# Patient Record
Sex: MALE | Race: WHITE | NOT HISPANIC OR LATINO | Employment: FULL TIME | URBAN - NONMETROPOLITAN AREA
[De-identification: names, ages, dates, MRNs, and addresses within clinical notes are randomized per-mention and may not be internally consistent; named-entity substitution may affect disease eponyms.]

---

## 2018-02-12 ENCOUNTER — LAB (OUTPATIENT)
Dept: LAB | Facility: HOSPITAL | Age: 28
End: 2018-02-12
Attending: PEDIATRICS

## 2018-02-12 ENCOUNTER — TRANSCRIBE ORDERS (OUTPATIENT)
Dept: ADMINISTRATIVE | Facility: HOSPITAL | Age: 28
End: 2018-02-12

## 2018-02-12 DIAGNOSIS — R50.9 FEVER, UNSPECIFIED FEVER CAUSE: ICD-10-CM

## 2018-02-12 DIAGNOSIS — R50.9 FEVER, UNSPECIFIED FEVER CAUSE: Primary | ICD-10-CM

## 2018-02-12 LAB
FLUAV AG NPH QL: POSITIVE
FLUBV AG NPH QL IA: NEGATIVE

## 2018-02-12 PROCEDURE — 87804 INFLUENZA ASSAY W/OPTIC: CPT

## 2020-02-03 ENCOUNTER — OFFICE VISIT (OUTPATIENT)
Dept: RETAIL CLINIC | Facility: CLINIC | Age: 30
End: 2020-02-03

## 2020-02-03 VITALS
HEART RATE: 99 BPM | BODY MASS INDEX: 23.61 KG/M2 | WEIGHT: 150.4 LBS | SYSTOLIC BLOOD PRESSURE: 94 MMHG | TEMPERATURE: 98.7 F | DIASTOLIC BLOOD PRESSURE: 66 MMHG | RESPIRATION RATE: 20 BRPM | HEIGHT: 67 IN | OXYGEN SATURATION: 97 %

## 2020-02-03 DIAGNOSIS — J06.9 ACUTE URI: Primary | ICD-10-CM

## 2020-02-03 DIAGNOSIS — H61.23 BILATERAL IMPACTED CERUMEN: ICD-10-CM

## 2020-02-03 PROCEDURE — 99213 OFFICE O/P EST LOW 20 MIN: CPT | Performed by: NURSE PRACTITIONER

## 2020-02-03 PROCEDURE — 69210 REMOVE IMPACTED EAR WAX UNI: CPT | Performed by: NURSE PRACTITIONER

## 2020-02-04 NOTE — PROGRESS NOTES
"  Chief Complaint   Patient presents with   • Sinus Problem     Subjective   Abdi Krishna is a 29 y.o. male who presents to the clinic today with complaints of:  Sinus Problem   Episode onset: four days ago. The problem has been waxing and waning since onset. There has been no fever. Associated symptoms include congestion, coughing (not much), ear pain (pressure, hearing muffled), sinus pressure, sneezing and a sore throat (a little). Pertinent negatives include no chills, headaches or shortness of breath. Treatments tried: Sudafed, Ibuprofen. The treatment provided mild relief.     Current Outpatient Medications:   •  lisdexamfetamine (VYVANSE) 50 MG capsule, Vyvanse 50 mg capsule  Take 1 capsule every day by oral route in the morning for 30 days., Disp: , Rfl:     Allergies:  Patient has no known allergies.    History reviewed. No pertinent past medical history.  History reviewed. No pertinent surgical history.  History reviewed. No pertinent family history.  Social History     Tobacco Use   • Smoking status: Not on file   Substance Use Topics   • Alcohol use: Not on file   • Drug use: Not on file       Review of Systems  Review of Systems   Constitutional: Negative for chills and fever.   HENT: Positive for congestion, ear pain (pressure, hearing muffled), postnasal drip, sinus pressure, sneezing and sore throat (a little). Negative for trouble swallowing.    Respiratory: Positive for cough (not much). Negative for shortness of breath and wheezing.    Gastrointestinal: Negative.    Neurological: Negative for headaches.       Objective   BP 94/66   Pulse 99   Temp 98.7 °F (37.1 °C)   Resp 20   Ht 170.2 cm (67\")   Wt 68.2 kg (150 lb 6.4 oz)   SpO2 97%   BMI 23.56 kg/m²       Physical Exam   Constitutional: He is oriented to person, place, and time. He appears well-developed and well-nourished. He is cooperative.  Non-toxic appearance. No distress.   HENT:   Head: Normocephalic and atraumatic.   Right Ear: " Tympanic membrane and ear canal normal. Tympanic membrane is not perforated, not erythematous, not retracted and not bulging.   Left Ear: Tympanic membrane and ear canal normal. Tympanic membrane is not perforated, not erythematous, not retracted and not bulging.   Nose: Mucosal edema present. No sinus tenderness.   Mouth/Throat: Uvula is midline, oropharynx is clear and moist and mucous membranes are normal.   Bilateral canals occluded with cerumen   Eyes: Pupils are equal, round, and reactive to light. Conjunctivae, EOM and lids are normal.   Neck: Trachea normal and normal range of motion. Neck supple.   Cardiovascular: Normal rate, regular rhythm, S1 normal, S2 normal and normal heart sounds.   Pulmonary/Chest: Effort normal and breath sounds normal. He has no wheezes. He has no rhonchi. He has no rales.   Lymphadenopathy:     He has no cervical adenopathy.   Neurological: He is alert and oriented to person, place, and time. Coordination and gait normal.   Skin: Skin is warm, dry and intact.   Psychiatric: He has a normal mood and affect. His speech is normal and behavior is normal.   Vitals reviewed.      Assessment/Plan     Abdi was seen today for sinus problem.    Diagnoses and all orders for this visit:    Acute URI    Bilateral impacted cerumen    Other orders  -     Ear Cerumen Removal      Ear Cerumen Removal  Date/Time: 2/3/2020 7:00 PM  Performed by: Leslye Webb APRN  Authorized by: Leslye Webb APRN   Consent: Verbal consent obtained.  Risks and benefits: risks, benefits and alternatives were discussed  Consent given by: patient  Patient understanding: patient states understanding of the procedure being performed  Required items: required blood products, implants, devices, and special equipment available  Patient identity confirmed: verbally with patient and provided demographic data    Anesthesia:  Local Anesthetic: none  Cerumenolytics applied prior to procedure: Debrox [id number] exp  7/12/20.  Location details: left ear and right ear  Patient tolerance: Patient tolerated the procedure well with no immediate complications  Comments: Left ear irrigated with 2/3 warm water and 1/3 hydrogen peroxide.  Large amount of cerumen irrigated out. Large cerumen plug removed with curette once near entrance of canal.   Right ear irrigated with 2/3 warm water and 1/3 hydrogen peroxide.  Large amount of cerumen irrigated out. Large cerumen plug removed with curette once near entrance of canal.   Post procedure:  Canals clear.  Bilateral TMs intact and appear normal.  He reported improved hearing.  Procedure type: instrumentation and irrigation   Sedation:  Patient sedated: no          Your ears were irrigated today to remove ear wax.  Post procedure your ear drums were intact and appeared normal.     In the future consider using Debrox ear wax removal kit at home.     Try saline nasal spray as discussed.     Continue Sudafed as long as it isn't too drying.     Follow up with Dr. Santos if no improvement.

## 2020-02-04 NOTE — PATIENT INSTRUCTIONS
Your ears were irrigated today to remove ear wax.  Post procedure your ear drums were intact and appeared normal.     In the future consider using Debrox ear wax removal kit at home.     Try saline nasal spray as discussed.     Continue Sudafed as long as it isn't too drying.     Follow up with Dr. Santos if no improvement.       Upper Respiratory Infection, Adult  An upper respiratory infection (URI) is a common viral infection of the nose, throat, and upper air passages that lead to the lungs. The most common type of URI is the common cold. URIs usually get better on their own, without medical treatment.  What are the causes?  A URI is caused by a virus. You may catch a virus by:  · Breathing in droplets from an infected person's cough or sneeze.  · Touching something that has been exposed to the virus (contaminated) and then touching your mouth, nose, or eyes.  What increases the risk?  You are more likely to get a URI if:  · You are very young or very old.  · It is chucho or winter.  · You have close contact with others, such as at a , school, or health care facility.  · You smoke.  · You have long-term (chronic) heart or lung disease.  · You have a weakened disease-fighting (immune) system.  · You have nasal allergies or asthma.  · You are experiencing a lot of stress.  · You work in an area that has poor air circulation.  · You have poor nutrition.  What are the signs or symptoms?  A URI usually involves some of the following symptoms:  · Runny or stuffy (congested) nose.  · Sneezing.  · Cough.  · Sore throat.  · Headache.  · Fatigue.  · Fever.  · Loss of appetite.  · Pain in your forehead, behind your eyes, and over your cheekbones (sinus pain).  · Muscle aches.  · Redness or irritation of the eyes.  · Pressure in the ears or face.  How is this diagnosed?  This condition may be diagnosed based on your medical history and symptoms, and a physical exam. Your health care provider may use a cotton swab to  take a mucus sample from your nose (nasal swab). This sample can be tested to determine what virus is causing the illness.  How is this treated?  URIs usually get better on their own within 7-10 days. You can take steps at home to relieve your symptoms. Medicines cannot cure URIs, but your health care provider may recommend certain medicines to help relieve symptoms, such as:  · Over-the-counter cold medicines.  · Cough suppressants. Coughing is a type of defense against infection that helps to clear the respiratory system, so take these medicines only as recommended by your health care provider.  · Fever-reducing medicines.  Follow these instructions at home:  Activity  · Rest as needed.  · If you have a fever, stay home from work or school until your fever is gone or until your health care provider says you are no longer contagious. Your health care provider may have you wear a face mask to prevent your infection from spreading.  Relieving symptoms  · Gargle with a salt-water mixture 3-4 times a day or as needed. To make a salt-water mixture, completely dissolve ½-1 tsp of salt in 1 cup of warm water.  · Use a cool-mist humidifier to add moisture to the air. This can help you breathe more easily.  Eating and drinking    · Drink enough fluid to keep your urine pale yellow.  · Eat soups and other clear broths.  General instructions    · Take over-the-counter and prescription medicines only as told by your health care provider. These include cold medicines, fever reducers, and cough suppressants.  · Do not use any products that contain nicotine or tobacco, such as cigarettes and e-cigarettes. If you need help quitting, ask your health care provider.  · Stay away from secondhand smoke.  · Stay up to date on all immunizations, including the yearly (annual) flu vaccine.  · Keep all follow-up visits as told by your health care provider. This is important.  How to prevent the spread of infection to others    · URIs can be  passed from person to person (are contagious). To prevent the infection from spreading:  ? Wash your hands often with soap and water. If soap and water are not available, use hand .  ? Avoid touching your mouth, face, eyes, or nose.  ? Cough or sneeze into a tissue or your sleeve or elbow instead of into your hand or into the air.  Contact a health care provider if:  · You are getting worse instead of better.  · You have a fever or chills.  · Your mucus is brown or red.  · You have yellow or brown discharge coming from your nose.  · You have pain in your face, especially when you bend forward.  · You have swollen neck glands.  · You have pain while swallowing.  · You have white areas in the back of your throat.  Get help right away if:  · You have shortness of breath that gets worse.  · You have severe or persistent:  ? Headache.  ? Ear pain.  ? Sinus pain.  ? Chest pain.  · You have chronic lung disease along with any of the following:  ? Wheezing.  ? Prolonged cough.  ? Coughing up blood.  ? A change in your usual mucus.  · You have a stiff neck.  · You have changes in your:  ? Vision.  ? Hearing.  ? Thinking.  ? Mood.  Summary  · An upper respiratory infection (URI) is a common infection of the nose, throat, and upper air passages that lead to the lungs.  · A URI is caused by a virus.  · URIs usually get better on their own within 7-10 days.  · Medicines cannot cure URIs, but your health care provider may recommend certain medicines to help relieve symptoms.  This information is not intended to replace advice given to you by your health care provider. Make sure you discuss any questions you have with your health care provider.  Document Released: 06/13/2002 Document Revised: 08/03/2018 Document Reviewed: 08/03/2018  [employer] Interactive Patient Education © 2019 [employer]      Earwax Buildup, Adult  The ears produce a substance called earwax that helps keep bacteria out of the ear and protects the  skin in the ear canal. Occasionally, earwax can build up in the ear and cause discomfort or hearing loss.  What increases the risk?  This condition is more likely to develop in people who:  · Are male.  · Are elderly.  · Naturally produce more earwax.  · Clean their ears often with cotton swabs.  · Use earplugs often.  · Use in-ear headphones often.  · Wear hearing aids.  · Have narrow ear canals.  · Have earwax that is overly thick or sticky.  · Have eczema.  · Are dehydrated.  · Have excess hair in the ear canal.  What are the signs or symptoms?  Symptoms of this condition include:  · Reduced or muffled hearing.  · A feeling of fullness in the ear or feeling that the ear is plugged.  · Fluid coming from the ear.  · Ear pain.  · Ear itch.  · Ringing in the ear.  · Coughing.  · An obvious piece of earwax that can be seen inside the ear canal.  How is this diagnosed?  This condition may be diagnosed based on:  · Your symptoms.  · Your medical history.  · An ear exam. During the exam, your health care provider will look into your ear with an instrument called an otoscope.  You may have tests, including a hearing test.  How is this treated?  This condition may be treated by:  · Using ear drops to soften the earwax.  · Having the earwax removed by a health care provider. The health care provider may:  ? Flush the ear with water.  ? Use an instrument that has a loop on the end (curette).  ? Use a suction device.  · Surgery to remove the wax buildup. This may be done in severe cases.  Follow these instructions at home:    · Take over-the-counter and prescription medicines only as told by your health care provider.  · Do not put any objects, including cotton swabs, into your ear. You can clean the opening of your ear canal with a washcloth or facial tissue.  · Follow instructions from your health care provider about cleaning your ears. Do not over-clean your ears.  · Drink enough fluid to keep your urine clear or pale  yellow. This will help to thin the earwax.  · Keep all follow-up visits as told by your health care provider. If earwax builds up in your ears often or if you use hearing aids, consider seeing your health care provider for routine, preventive ear cleanings. Ask your health care provider how often you should schedule your cleanings.  · If you have hearing aids, clean them according to instructions from the  and your health care provider.  Contact a health care provider if:  · You have ear pain.  · You develop a fever.  · You have blood, pus, or other fluid coming from your ear.  · You have hearing loss.  · You have ringing in your ears that does not go away.  · Your symptoms do not improve with treatment.  · You feel like the room is spinning (vertigo).  Summary  · Earwax can build up in the ear and cause discomfort or hearing loss.  · The most common symptoms of this condition include reduced or muffled hearing and a feeling of fullness in the ear or feeling that the ear is plugged.  · This condition may be diagnosed based on your symptoms, your medical history, and an ear exam.  · This condition may be treated by using ear drops to soften the earwax or by having the earwax removed by a health care provider.  · Do not put any objects, including cotton swabs, into your ear. You can clean the opening of your ear canal with a washcloth or facial tissue.  This information is not intended to replace advice given to you by your health care provider. Make sure you discuss any questions you have with your health care provider.  Document Released: 01/25/2006 Document Revised: 11/29/2018 Document Reviewed: 02/28/2018  [employer] Interactive Patient Education © 2019 [employer]

## 2021-01-22 ENCOUNTER — OFFICE VISIT (OUTPATIENT)
Dept: FAMILY MEDICINE CLINIC | Facility: CLINIC | Age: 31
End: 2021-01-22

## 2021-01-22 ENCOUNTER — LAB (OUTPATIENT)
Dept: LAB | Facility: HOSPITAL | Age: 31
End: 2021-01-22

## 2021-01-22 VITALS
RESPIRATION RATE: 16 BRPM | OXYGEN SATURATION: 98 % | HEART RATE: 66 BPM | DIASTOLIC BLOOD PRESSURE: 75 MMHG | BODY MASS INDEX: 23.48 KG/M2 | HEIGHT: 70 IN | WEIGHT: 164 LBS | TEMPERATURE: 97.7 F | SYSTOLIC BLOOD PRESSURE: 127 MMHG

## 2021-01-22 DIAGNOSIS — F90.0 ATTENTION DEFICIT HYPERACTIVITY DISORDER (ADHD), PREDOMINANTLY INATTENTIVE TYPE: ICD-10-CM

## 2021-01-22 DIAGNOSIS — F90.0 ATTENTION DEFICIT HYPERACTIVITY DISORDER (ADHD), PREDOMINANTLY INATTENTIVE TYPE: Primary | ICD-10-CM

## 2021-01-22 DIAGNOSIS — F17.220 CHEWING TOBACCO NICOTINE DEPENDENCE WITHOUT COMPLICATION: ICD-10-CM

## 2021-01-22 LAB
AMPHET+METHAMPHET UR QL: POSITIVE
AMPHETAMINES UR QL: NEGATIVE
BARBITURATES UR QL SCN: NEGATIVE
BENZODIAZ UR QL SCN: NEGATIVE
BUPRENORPHINE SERPL-MCNC: NEGATIVE NG/ML
CANNABINOIDS SERPL QL: NEGATIVE
COCAINE UR QL: NEGATIVE
METHADONE UR QL SCN: NEGATIVE
OPIATES UR QL: NEGATIVE
OXYCODONE UR QL SCN: NEGATIVE
PCP UR QL SCN: NEGATIVE
PROPOXYPH UR QL: NEGATIVE
TRICYCLICS UR QL SCN: NEGATIVE

## 2021-01-22 PROCEDURE — 99213 OFFICE O/P EST LOW 20 MIN: CPT | Performed by: NURSE PRACTITIONER

## 2021-01-22 PROCEDURE — 80306 DRUG TEST PRSMV INSTRMNT: CPT

## 2021-01-22 NOTE — PATIENT INSTRUCTIONS

## 2021-01-22 NOTE — PROGRESS NOTES
"Chief Complaint  ADHD (No complaints or concerns)    Subjective    History of Present Illness      Patient presents to [facility] for   ADHD/Mood HPI    Visit for:  follow-up. Most recent visit was 3 months ago.  Interim changes to follow up on today: no change in medication  Work/School Performance:  going well  Cognitive:  able to focus    Behavior  Hyperactivity: is not hyperactive  Impulsivity: no impulsivity  Tasking: able to initiate tasks    Social  ADHD social/impulsive symptoms:  not impatient    Behavioral health  Behavior: no concerns  Emotional coping: demonstrates feelings of no concerns         Review of Systems   Psychiatric/Behavioral: Negative.    All other systems reviewed and are negative.      I have reviewed and agree with the HPI and ROS information as above.  ESTEBAN Owen     Objective   Vital Signs:   /75 (BP Location: Left arm, Patient Position: Sitting)   Pulse 66   Temp 97.7 °F (36.5 °C)   Resp 16   Ht 177.8 cm (70\")   Wt 74.4 kg (164 lb)   SpO2 98%   BMI 23.53 kg/m²       Physical Exam  Constitutional:       Appearance: Normal appearance. He is well-developed.   HENT:      Head: Normocephalic and atraumatic.      Right Ear: Tympanic membrane, ear canal and external ear normal.      Left Ear: Tympanic membrane, ear canal and external ear normal.      Nose: Nose normal. No septal deviation, nasal tenderness or congestion.      Mouth/Throat:      Lips: Pink. No lesions.      Mouth: Mucous membranes are moist. No oral lesions.      Dentition: Normal dentition.      Pharynx: Oropharynx is clear. No pharyngeal swelling, oropharyngeal exudate or posterior oropharyngeal erythema.   Eyes:      General: Lids are normal. Vision grossly intact. No scleral icterus.        Right eye: No discharge.         Left eye: No discharge.      Extraocular Movements: Extraocular movements intact.      Conjunctiva/sclera: Conjunctivae normal.      Right eye: " Right conjunctiva is not injected.      Left eye: Left conjunctiva is not injected.      Pupils: Pupils are equal, round, and reactive to light.   Neck:      Musculoskeletal: Full passive range of motion without pain, normal range of motion and neck supple.      Thyroid: No thyroid mass.      Trachea: Trachea normal.   Cardiovascular:      Rate and Rhythm: Normal rate and regular rhythm.      Heart sounds: Normal heart sounds. No murmur. No gallop.    Pulmonary:      Effort: Pulmonary effort is normal.      Breath sounds: Normal breath sounds and air entry. No wheezing, rhonchi or rales.   Abdominal:      General: There is no distension.      Palpations: Abdomen is soft. There is no mass.      Tenderness: There is no abdominal tenderness. There is no right CVA tenderness, left CVA tenderness, guarding or rebound.   Musculoskeletal: Normal range of motion.         General: No tenderness or deformity.      Thoracic back: Normal.      Right lower leg: No edema.      Left lower leg: No edema.   Skin:     General: Skin is warm and dry.      Coloration: Skin is not jaundiced.      Findings: No rash.   Neurological:      Mental Status: He is alert and oriented to person, place, and time.      Cranial Nerves: Cranial nerves are intact.      Sensory: Sensation is intact.      Motor: Motor function is intact.      Coordination: Coordination is intact.      Gait: Gait is intact.      Deep Tendon Reflexes: Reflexes are normal and symmetric.   Psychiatric:         Mood and Affect: Mood and affect normal.         Judgment: Judgment normal.          Result Review  Data Reviewed:                   Assessment and Plan    Patient's Body mass index is 23.53 kg/m².     Problem List Items Addressed This Visit        Mental Health    Attention deficit hyperactivity disorder (ADHD), predominantly inattentive type - Primary    Relevant Orders    Urine Drug Screen - Urine, Clean Catch (Completed)       Tobacco    Chewing tobacco nicotine  dependence without complication      Doing well, wishes to continue same. Will collect routine uds today. No desire to quit chewing tobacco. Declines flu shot.         Follow Up   Return in about 3 months (around 4/22/2021).  Patient was given instructions and counseling regarding his condition or for health maintenance advice. Please see specific information pulled into the AVS if appropriate.

## 2021-01-23 DIAGNOSIS — F90.0 ATTENTION DEFICIT HYPERACTIVITY DISORDER (ADHD), PREDOMINANTLY INATTENTIVE TYPE: ICD-10-CM

## 2021-01-25 NOTE — PROGRESS NOTES
Please let pt know results: uds looks ok but Dr Santos denied his refill, likely due to jermaine potentially? Please see what Dr Santos says so we can let pt know, thanks.

## 2021-01-26 DIAGNOSIS — F90.0 ATTENTION DEFICIT HYPERACTIVITY DISORDER (ADHD), PREDOMINANTLY INATTENTIVE TYPE: ICD-10-CM

## 2021-01-26 NOTE — TELEPHONE ENCOUNTER
----- Message from Abdi Krishna sent at 1/25/2021  4:49 PM CST -----  Regarding: RE: Prescription Question  Contact: [phone]  I came for my check up on Friday the 22nd along with my urine test too, I think it wasn’t entered in the computer yet. Thanks!

## 2021-02-22 DIAGNOSIS — F90.0 ATTENTION DEFICIT HYPERACTIVITY DISORDER (ADHD), PREDOMINANTLY INATTENTIVE TYPE: ICD-10-CM

## 2021-02-23 DIAGNOSIS — F90.0 ATTENTION DEFICIT HYPERACTIVITY DISORDER (ADHD), PREDOMINANTLY INATTENTIVE TYPE: ICD-10-CM

## 2021-03-25 DIAGNOSIS — F90.0 ATTENTION DEFICIT HYPERACTIVITY DISORDER (ADHD), PREDOMINANTLY INATTENTIVE TYPE: ICD-10-CM

## 2021-03-25 NOTE — TELEPHONE ENCOUNTER
Caller: Abdi Krishna    Relationship: Self    Best call back number: [phone]    Medication needed:   Requested Prescriptions     Pending Prescriptions Disp Refills   • lisdexamfetamine (Vyvanse) 50 MG capsule 30 capsule 0     Sig: Take 1 capsule by mouth Every Morning for 30 days       What is the patient's preferred pharmacy: [facility] [address] - [phone]  - [phone] FX

## 2021-05-04 ENCOUNTER — OFFICE VISIT (OUTPATIENT)
Dept: FAMILY MEDICINE CLINIC | Facility: CLINIC | Age: 31
End: 2021-05-04

## 2021-05-04 VITALS
DIASTOLIC BLOOD PRESSURE: 72 MMHG | HEART RATE: 82 BPM | RESPIRATION RATE: 20 BRPM | WEIGHT: 160 LBS | SYSTOLIC BLOOD PRESSURE: 118 MMHG | TEMPERATURE: 97.2 F | HEIGHT: 69 IN | BODY MASS INDEX: 23.7 KG/M2

## 2021-05-04 DIAGNOSIS — F90.0 ATTENTION DEFICIT HYPERACTIVITY DISORDER (ADHD), PREDOMINANTLY INATTENTIVE TYPE: Primary | ICD-10-CM

## 2021-05-04 PROCEDURE — 99213 OFFICE O/P EST LOW 20 MIN: CPT | Performed by: NURSE PRACTITIONER

## 2021-05-04 NOTE — ASSESSMENT & PLAN NOTE
Patient is doing well on medication. Denies any concerns or complaints at this time. UDS is UTD and appropriate.

## 2021-05-04 NOTE — PROGRESS NOTES
"Chief Complaint  ADHD    Subjective    History of Present Illness      Patient presents to [facility] for   Pt states that he is here for a f/u with ADHD and is doing well with medication and the dose. PT states that he is focusing well, has an adequate appetite and sleeps good a night.      ADHD/Mood HPI    Visit for:  follow-up. Most recent visit was 3 months ago.  Interim changes to follow up on today: no change in medication  Work/School Performance:  going well  Cognitive:  able to focus    Behavior  Hyperactivity: is not hyperactive  Impulsivity: no impulsivity  Tasking: able to mult-task    Social  ADHD social/impulsive symptoms:  not impatient    Behavioral health  Behavior: no concerns  Emotional coping: demonstrates feelings of no concerns         Review of Systems   Constitutional: Negative.    HENT: Negative.    Eyes: Negative.    Respiratory: Negative.    Cardiovascular: Negative.    Gastrointestinal: Negative.    Endocrine: Negative.    Genitourinary: Negative.    Musculoskeletal: Negative.    Skin: Negative.    Allergic/Immunologic: Negative.    Neurological: Negative.    Hematological: Negative.    Psychiatric/Behavioral: Negative.        I have reviewed and agree with the HPI and ROS information as above.  Carol Glover, APRN     Objective   Vital Signs:   /72   Pulse 82   Temp 97.2 °F (36.2 °C)   Resp 20   Ht 175.3 cm (69\")   Wt 72.6 kg (160 lb)   BMI 23.63 kg/m²       Physical Exam  Vitals and nursing note reviewed.   Constitutional:       Appearance: Normal appearance. He is well-developed.   HENT:      Head: Normocephalic and atraumatic.      Right Ear: Tympanic membrane, ear canal and external ear normal.      Left Ear: Tympanic membrane, ear canal and external ear normal.      Nose: Nose normal. No septal deviation, nasal tenderness or congestion.      Mouth/Throat:      Lips: Pink. No lesions.      Mouth: Mucous membranes are moist. No oral " lesions.      Dentition: Normal dentition.      Pharynx: Oropharynx is clear. No pharyngeal swelling, oropharyngeal exudate or posterior oropharyngeal erythema.   Eyes:      General: Lids are normal. Vision grossly intact. No scleral icterus.        Right eye: No discharge.         Left eye: No discharge.      Extraocular Movements: Extraocular movements intact.      Conjunctiva/sclera: Conjunctivae normal.      Right eye: Right conjunctiva is not injected.      Left eye: Left conjunctiva is not injected.      Pupils: Pupils are equal, round, and reactive to light.   Neck:      Thyroid: No thyroid mass.      Trachea: Trachea normal.   Cardiovascular:      Rate and Rhythm: Normal rate and regular rhythm.      Heart sounds: Normal heart sounds. No murmur heard.   No gallop.    Pulmonary:      Effort: Pulmonary effort is normal.      Breath sounds: Normal breath sounds and air entry. No wheezing, rhonchi or rales.   Musculoskeletal:         General: No tenderness or deformity. Normal range of motion.      Cervical back: Full passive range of motion without pain, normal range of motion and neck supple.      Thoracic back: Normal.      Right lower leg: No edema.      Left lower leg: No edema.   Skin:     General: Skin is warm and dry.      Coloration: Skin is not jaundiced.      Findings: No rash.   Neurological:      Mental Status: He is alert and oriented to person, place, and time.      Cranial Nerves: Cranial nerves are intact.      Sensory: Sensation is intact.      Motor: Motor function is intact.      Coordination: Coordination is intact.      Gait: Gait is intact.      Deep Tendon Reflexes: Reflexes are normal and symmetric.   Psychiatric:         Mood and Affect: Mood and affect normal.         Behavior: Behavior normal.         Judgment: Judgment normal.                     Assessment and Plan      Problem List Items Addressed This Visit        Mental Health    Attention deficit hyperactivity disorder (ADHD),  predominantly inattentive type - Primary    Current Assessment & Plan     Patient is doing well on medication. Denies any concerns or complaints at this time. UDS is UTD and appropriate.                      Follow Up   Return in about 3 months (around 8/4/2021) for ADHD follow up.  Patient was given instructions and counseling regarding his condition or for health maintenance advice. Please see specific information pulled into the AVS if appropriate.     ADHD Follow up:    Yoni report initiated in office and is clean. ADRS (Adult ADHD Assessment Form) reviewed in detail, scanned in chart, and has been reviewed at time of appointment.  All questions, including medication and side effects, were discussed in detail at time of patient's visit. Patient will continue same medication which was discussed at today's visit. Patient is to return in 3 month(s).

## 2021-06-04 DIAGNOSIS — F90.0 ATTENTION DEFICIT HYPERACTIVITY DISORDER (ADHD), PREDOMINANTLY INATTENTIVE TYPE: ICD-10-CM

## 2021-07-13 DIAGNOSIS — F90.0 ATTENTION DEFICIT HYPERACTIVITY DISORDER (ADHD), PREDOMINANTLY INATTENTIVE TYPE: ICD-10-CM

## 2021-07-13 NOTE — TELEPHONE ENCOUNTER
Caller: Abdi Krishna    Relationship: Self    Best call back number: [phone]     Medication needed: lisdexamfetamine (Vyvanse) 50 MG capsule   Requested Prescriptions      No prescriptions requested or ordered in this encounter       When do you need the refill by: 07/13/2021    What additional details did the patient provide when requesting the medication: ALMOST OUT     Does the patient have less than a 3 day supply:  [x] Yes  [] No    What is the patient's preferred pharmacy: [address] [facility] [place] - [phone]  - [phone] FX

## 2021-09-13 ENCOUNTER — OFFICE VISIT (OUTPATIENT)
Dept: FAMILY MEDICINE CLINIC | Facility: CLINIC | Age: 31
End: 2021-09-13

## 2021-09-13 VITALS
TEMPERATURE: 98.2 F | WEIGHT: 162 LBS | HEIGHT: 70 IN | RESPIRATION RATE: 20 BRPM | BODY MASS INDEX: 23.19 KG/M2 | SYSTOLIC BLOOD PRESSURE: 104 MMHG | HEART RATE: 82 BPM | DIASTOLIC BLOOD PRESSURE: 70 MMHG

## 2021-09-13 DIAGNOSIS — F90.2 ATTENTION DEFICIT HYPERACTIVITY DISORDER (ADHD), COMBINED TYPE: Primary | ICD-10-CM

## 2021-09-13 DIAGNOSIS — F90.0 ATTENTION DEFICIT HYPERACTIVITY DISORDER (ADHD), PREDOMINANTLY INATTENTIVE TYPE: Primary | ICD-10-CM

## 2021-09-13 PROCEDURE — 99213 OFFICE O/P EST LOW 20 MIN: CPT | Performed by: NURSE PRACTITIONER

## 2021-09-13 NOTE — PROGRESS NOTES
"Chief Complaint  f/u ADHD    Subjective    History of Present Illness      Patient presents to [facility] for   Pt states he is here for a f/u with ADHD and is doing well with medication. Pt states that he is focusing well, has an adequate appetite and sleeps good at [facility].      ADHD/Mood HPI    Visit for:  follow-up. Most recent visit was 4 months ago.  Interim changes to follow up on today: no change in medication  Work/School Performance:  going well  Cognitive:  able to focus    Behavior  Hyperactivity: is not hyperactive  Impulsivity: no impulsivity  Tasking: able to mult-task    Social  ADHD social/impulsive symptoms:  not impatient    Behavioral health  Behavior: no concerns  Emotional coping: demonstrates feelings of no concerns       Review of Systems    I have reviewed and agree with the HPI and ROS information as above.  Sulma Fry, ESTEBAN     Objective   Vital Signs:   /70   Pulse 82   Temp 98.2 °F (36.8 °C)   Resp 20   Ht 177.8 cm (70\")   Wt 73.5 kg (162 lb)   BMI 23.24 kg/m²       Physical Exam  Constitutional:       Appearance: Normal appearance. He is well-developed.   HENT:      Head: Normocephalic and atraumatic.      Right Ear: External ear normal.      Left Ear: External ear normal.      Nose: Nose normal. No nasal tenderness or congestion.      Mouth/Throat:      Lips: Pink. No lesions.      Mouth: Mucous membranes are moist. No oral lesions.      Dentition: Normal dentition.      Pharynx: Oropharynx is clear. No pharyngeal swelling, oropharyngeal exudate or posterior oropharyngeal erythema.   Eyes:      General: Lids are normal. Vision grossly intact. No scleral icterus.        Right eye: No discharge.         Left eye: No discharge.      Extraocular Movements: Extraocular movements intact.      Conjunctiva/sclera: Conjunctivae normal.      Right eye: Right conjunctiva is not injected.      Left eye: Left conjunctiva is not injected.      " Pupils: Pupils are equal, round, and reactive to light.   Cardiovascular:      Rate and Rhythm: Normal rate and regular rhythm.      Heart sounds: Normal heart sounds. No murmur heard.   No gallop.    Pulmonary:      Effort: Pulmonary effort is normal.      Breath sounds: Normal breath sounds and air entry. No wheezing, rhonchi or rales.   Musculoskeletal:         General: No tenderness or deformity. Normal range of motion.      Cervical back: Full passive range of motion without pain, normal range of motion and neck supple.      Right lower leg: No edema.      Left lower leg: No edema.   Skin:     General: Skin is warm and dry.      Coloration: Skin is not jaundiced.      Findings: No rash.   Neurological:      Mental Status: He is alert and oriented to person, place, and time.      Cranial Nerves: Cranial nerves are intact.      Sensory: Sensation is intact.      Motor: Motor function is intact.      Coordination: Coordination is intact.      Gait: Gait is intact.   Psychiatric:         Attention and Perception: Attention normal.         Mood and Affect: Mood and affect normal.         Behavior: Behavior is not hyperactive. Behavior is cooperative.         Thought Content: Thought content normal.         Judgment: Judgment normal.          Result Review  Data Reviewed:                   Assessment and Plan      Problem List Items Addressed This Visit        Mental Health    Attention deficit hyperactivity disorder (ADHD), predominantly inattentive type - Primary      Patient comes in today to follow-up on ADHD.  He is doing well.  Wishes to continue same.  He is not yet due for drug screen but he does have a professional one that he has brought with him from another lab that I did view and is negative.  He is going to try to upload back to his MyChart and if he is unsuccessful he will bring a copy to his next visit.  Yoni is pending.        Follow Up   Return in about 3 months (around 12/13/2021).  Patient was  given instructions and counseling regarding his condition or for health maintenance advice. Please see specific information pulled into the AVS if appropriate.

## 2021-11-19 DIAGNOSIS — F90.2 ATTENTION DEFICIT HYPERACTIVITY DISORDER (ADHD), COMBINED TYPE: ICD-10-CM

## 2021-11-19 NOTE — TELEPHONE ENCOUNTER
Caller: Abdi Krishna    Relationship: Self    Best call back number: [phone]     Requested Prescriptions:   Requested Prescriptions     Pending Prescriptions Disp Refills   • lisdexamfetamine (Vyvanse) 50 MG capsule 30 capsule 0     Sig: Take 1 capsule by mouth Every Morning for 30 days        Pharmacy where request should be sent: [address] - [phone]  - [phone] FX     Additional details provided by patient: COMPLETELY OUT    Does the patient have less than a 3 day supply:  [x] Yes  [] No    Trae Wasserman Rep   11/19/21 14:14 CST

## 2021-12-29 DIAGNOSIS — F90.2 ATTENTION DEFICIT HYPERACTIVITY DISORDER (ADHD), COMBINED TYPE: ICD-10-CM

## 2021-12-29 NOTE — TELEPHONE ENCOUNTER
Caller: Abdi Krishna    Relationship: Self    Best call back [phone]    Requested Prescriptions:   Requested Prescriptions     Pending Prescriptions Disp Refills   • lisdexamfetamine (Vyvanse) 50 MG capsule 30 capsule 0     Sig: Take 1 capsule by mouth Every Morning for 30 days        Pharmacy where request should be sent: [address] - [phone] PH - [phone]     Does the patient have less than a 3 day supply:  [x] Yes  [] No    rTae Esteban Rep   12/29/21 13:54 CST

## 2022-01-31 ENCOUNTER — LAB (OUTPATIENT)
Dept: LAB | Facility: HOSPITAL | Age: 32
End: 2022-01-31

## 2022-01-31 ENCOUNTER — OFFICE VISIT (OUTPATIENT)
Dept: FAMILY MEDICINE CLINIC | Facility: CLINIC | Age: 32
End: 2022-01-31

## 2022-01-31 VITALS
SYSTOLIC BLOOD PRESSURE: 120 MMHG | BODY MASS INDEX: 22.76 KG/M2 | HEART RATE: 93 BPM | TEMPERATURE: 98 F | HEIGHT: 70 IN | DIASTOLIC BLOOD PRESSURE: 80 MMHG | WEIGHT: 159 LBS | RESPIRATION RATE: 20 BRPM

## 2022-01-31 DIAGNOSIS — Z51.81 MEDICATION MONITORING ENCOUNTER: ICD-10-CM

## 2022-01-31 DIAGNOSIS — F90.0 ATTENTION DEFICIT HYPERACTIVITY DISORDER (ADHD), PREDOMINANTLY INATTENTIVE TYPE: Primary | ICD-10-CM

## 2022-01-31 PROCEDURE — 99213 OFFICE O/P EST LOW 20 MIN: CPT | Performed by: NURSE PRACTITIONER

## 2022-01-31 PROCEDURE — 80306 DRUG TEST PRSMV INSTRMNT: CPT

## 2022-01-31 NOTE — PROGRESS NOTES
"Chief Complaint  f/u ADHD    Subjective    History of Present Illness      Patient presents to [facility] for   Pt states that he is here for a f/u with ADHD and is doing well with medication.          ADHD/Mood HPI    Visit for:  follow-up. Most recent visit was 4 months ago.  Interim changes to follow up on today: no change in medication  Work/School Performance:  going well  Cognitive:  able to focus    Behavior  Hyperactivity: is not hyperactive  Impulsivity: no impulsivity  Tasking: able to initiate tasks    Social  ADHD social/impulsive symptoms:  not impatient    Behavioral health  Behavior: no concerns  Emotional coping: demonstrates feelings of no concerns       Review of Systems   Constitutional: Negative.    HENT: Negative.    Eyes: Negative.    Respiratory: Negative.    Cardiovascular: Negative.    Gastrointestinal: Negative.    Endocrine: Negative.    Genitourinary: Negative.    Musculoskeletal: Negative.    Skin: Negative.    Allergic/Immunologic: Negative.    Neurological: Negative.    Hematological: Negative.    Psychiatric/Behavioral: Negative.        I have reviewed and agree with the HPI and ROS information as above.  Carol Bernstein, APRN     Objective   Vital Signs:   /80   Pulse 93   Temp 98 °F (36.7 °C)   Resp 20   Ht 177.8 cm (70\")   Wt 72.1 kg (159 lb)   BMI 22.81 kg/m²       Physical Exam  Constitutional:       Appearance: Normal appearance. He is well-developed.   HENT:      Head: Normocephalic and atraumatic.      Right Ear: External ear normal.      Left Ear: External ear normal.      Nose: Nose normal. No nasal tenderness or congestion.      Mouth/Throat:      Lips: Pink. No lesions.      Mouth: Mucous membranes are moist. No oral lesions.      Dentition: Normal dentition.      Pharynx: Oropharynx is clear. No pharyngeal swelling, oropharyngeal exudate or posterior oropharyngeal erythema.   Eyes:      General: Lids are normal. Vision grossly " intact. No scleral icterus.        Right eye: No discharge.         Left eye: No discharge.      Extraocular Movements: Extraocular movements intact.      Conjunctiva/sclera: Conjunctivae normal.      Right eye: Right conjunctiva is not injected.      Left eye: Left conjunctiva is not injected.      Pupils: Pupils are equal, round, and reactive to light.   Cardiovascular:      Rate and Rhythm: Normal rate and regular rhythm.      Heart sounds: Normal heart sounds. No murmur heard.  No gallop.    Pulmonary:      Effort: Pulmonary effort is normal.      Breath sounds: Normal breath sounds and air entry. No wheezing, rhonchi or rales.   Musculoskeletal:         General: No tenderness or deformity. Normal range of motion.      Cervical back: Full passive range of motion without pain, normal range of motion and neck supple.      Right lower leg: No edema.      Left lower leg: No edema.   Skin:     General: Skin is warm and dry.      Coloration: Skin is not jaundiced.      Findings: No rash.   Neurological:      Mental Status: He is alert and oriented to person, place, and time.      Cranial Nerves: Cranial nerves are intact.      Sensory: Sensation is intact.      Motor: Motor function is intact.      Coordination: Coordination is intact.      Gait: Gait is intact.   Psychiatric:         Attention and Perception: Attention normal.         Mood and Affect: Mood and affect normal.         Behavior: Behavior is not hyperactive. Behavior is cooperative.         Thought Content: Thought content normal.         Judgment: Judgment normal.          Result Review  Data Reviewed:   The following data was reviewed by: ESTEBAN Rudd on 01/31/2022:    Urine Drug Screen - Urine, Clean Catch (01/22/2021 16:14)           Assessment and Plan      Problem List Items Addressed This Visit        Endocrine and Metabolic    BMI 22.0-22.9, adult       Mental Health    Attention deficit hyperactivity disorder (ADHD), predominantly  inattentive type - Primary      Other Visit Diagnoses     Medication monitoring encounter        Relevant Orders    Urine Drug Screen - Urine, Clean Catch          Patient being seen today for 3-month ADHD follow-up.  He is doing well with Vyvanse 50 mg.  Wishes to continue the same at this time.  He is due for routine urine drug screen today.  Will order at this time.  Yoni is clean.  Continue same pending clean urine drug screen.  Follow-up 3 months.    Follow Up   Return in about 3 months (around 4/30/2022) for Recheck.  Patient was given instructions and counseling regarding his condition or for health maintenance advice. Please see specific information pulled into the AVS if appropriate.     ADHD Follow up:    Yoni report initiated in office and is clean. ADRS (Adult ADHD Assessment Form) reviewed in detail, scanned in chart, and has been reviewed at time of appointment.  All questions, including medication and side effects, were discussed in detail at time of patient's visit. Patient will continue same medication which was discussed at today's visit. Patient is to return in 3 month(s).

## 2022-03-28 DIAGNOSIS — F90.0 ATTENTION DEFICIT HYPERACTIVITY DISORDER (ADHD), PREDOMINANTLY INATTENTIVE TYPE: ICD-10-CM

## 2022-04-25 ENCOUNTER — OFFICE VISIT (OUTPATIENT)
Dept: FAMILY MEDICINE CLINIC | Facility: CLINIC | Age: 32
End: 2022-04-25

## 2022-04-25 VITALS
TEMPERATURE: 97 F | BODY MASS INDEX: 22.33 KG/M2 | DIASTOLIC BLOOD PRESSURE: 80 MMHG | HEIGHT: 70 IN | HEART RATE: 72 BPM | WEIGHT: 156 LBS | RESPIRATION RATE: 20 BRPM | OXYGEN SATURATION: 98 % | SYSTOLIC BLOOD PRESSURE: 110 MMHG

## 2022-04-25 DIAGNOSIS — F90.0 ATTENTION DEFICIT HYPERACTIVITY DISORDER (ADHD), PREDOMINANTLY INATTENTIVE TYPE: Primary | ICD-10-CM

## 2022-04-25 PROCEDURE — 99213 OFFICE O/P EST LOW 20 MIN: CPT | Performed by: NURSE PRACTITIONER

## 2022-04-25 NOTE — PROGRESS NOTES
"Chief Complaint  f/u ADHD    Subjective    History of Present Illness      Patient presents to [facility] for   Pt states that he is here for a f/u with ADHD and is doing well with medication.       ADHD/Mood HPI    Visit for:  follow-up. Most recent visit was 3 months ago.  Interim changes to follow up on today: no change in medication  Work/School Performance:  going well  Cognitive:  able to focus    Behavior  Hyperactivity: is not hyperactive  Impulsivity: no impulsivity  Tasking: able to mult-task    Social  ADHD social/impulsive symptoms:  not impatient    Behavioral health  Behavior: no concerns  Emotional coping: demonstrates feelings of no concerns       Review of Systems    I have reviewed and agree with the HPI information as above.  Carol Glover, APRN     Objective   Vital Signs:   /80   Pulse 72   Temp 97 °F (36.1 °C)   Resp 20   Ht 177.8 cm (70\")   Wt 70.8 kg (156 lb)   SpO2 98%   BMI 22.38 kg/m²     Patient's Body mass index is 22.38 kg/m². indicating that he is within normal range (BMI 18.5-24.9). No BMI management plan needed..      Physical Exam  Vitals and nursing note reviewed.   Constitutional:       Appearance: Normal appearance. He is well-developed.   HENT:      Head: Normocephalic and atraumatic.      Right Ear: Tympanic membrane, ear canal and external ear normal.      Left Ear: Tympanic membrane, ear canal and external ear normal.      Nose: Nose normal. No septal deviation, nasal tenderness or congestion.      Mouth/Throat:      Lips: Pink. No lesions.      Mouth: Mucous membranes are moist. No oral lesions.      Dentition: Normal dentition.      Pharynx: Oropharynx is clear. No pharyngeal swelling, oropharyngeal exudate or posterior oropharyngeal erythema.   Eyes:      General: Lids are normal. Vision grossly intact. No scleral icterus.        Right eye: No discharge.         Left eye: No discharge.      Extraocular Movements: " Extraocular movements intact.      Conjunctiva/sclera: Conjunctivae normal.      Right eye: Right conjunctiva is not injected.      Left eye: Left conjunctiva is not injected.      Pupils: Pupils are equal, round, and reactive to light.   Neck:      Thyroid: No thyroid mass.      Trachea: Trachea normal.   Cardiovascular:      Rate and Rhythm: Normal rate and regular rhythm.      Heart sounds: Normal heart sounds. No murmur heard.    No gallop.   Pulmonary:      Effort: Pulmonary effort is normal.      Breath sounds: Normal breath sounds and air entry. No wheezing, rhonchi or rales.   Musculoskeletal:         General: No tenderness or deformity. Normal range of motion.      Cervical back: Full passive range of motion without pain, normal range of motion and neck supple.      Thoracic back: Normal.      Right lower leg: No edema.      Left lower leg: No edema.   Skin:     General: Skin is warm and dry.      Coloration: Skin is not jaundiced.      Findings: No rash.   Neurological:      Mental Status: He is alert and oriented to person, place, and time.      Cranial Nerves: Cranial nerves are intact.      Sensory: Sensation is intact.      Motor: Motor function is intact.      Coordination: Coordination is intact.      Gait: Gait is intact.      Deep Tendon Reflexes: Reflexes are normal and symmetric.   Psychiatric:         Mood and Affect: Mood and affect normal.         Behavior: Behavior normal.         Judgment: Judgment normal.          BARRY-7:      PHQ-2 Depression Screening  Little interest or pleasure in doing things? 0-->not at all   Feeling down, depressed, or hopeless? 0-->not at all   PHQ-2 Total Score 0     PHQ-9 Depression Screening  Little interest or pleasure in doing things? 0-->not at all   Feeling down, depressed, or hopeless? 0-->not at all   Trouble falling or staying asleep, or sleeping too much?     Feeling tired or having little energy?     Poor appetite or overeating?     Feeling bad about  yourself - or that you are a failure or have let yourself or your family down?     Trouble concentrating on things, such as reading the newspaper or watching television?     Moving or speaking so slowly that other people could have noticed? Or the opposite - being so fidgety or restless that you have been moving around a lot more than usual?     Thoughts that you would be better off dead, or of hurting yourself in some way?     PHQ-9 Total Score 0   If you checked off any problems, how difficult have these problems made it for you to do your work, take care of things at home, or get along with other people?        Result Review  Data Reviewed:                   Assessment and Plan      Problem List Items Addressed This Visit        Mental Health    Attention deficit hyperactivity disorder (ADHD), predominantly inattentive type - Primary        Doing well. Denies concerns. UDS is UTD and appropriate.   ADHD Follow up:    Yoni report initiated in office and pending. ADRS (Adult ADHD Assessment Form) reviewed in detail, scanned in chart, and has been reviewed at time of appointment.  All questions, including medication and side effects, were discussed in detail at time of patient's visit. Patient will continue same medication which was discussed at today's visit. Patient is to return in 3 month(s).    Follow Up   Return in about 3 months (around 7/25/2022) for ADHD follow up.  Patient was given instructions and counseling regarding his condition or for health maintenance advice. Please see specific information pulled into the AVS if appropriate.

## 2022-06-27 DIAGNOSIS — F90.0 ATTENTION DEFICIT HYPERACTIVITY DISORDER (ADHD), PREDOMINANTLY INATTENTIVE TYPE: ICD-10-CM

## 2022-08-01 ENCOUNTER — OFFICE VISIT (OUTPATIENT)
Dept: FAMILY MEDICINE CLINIC | Facility: CLINIC | Age: 32
End: 2022-08-01

## 2022-08-01 VITALS
SYSTOLIC BLOOD PRESSURE: 118 MMHG | BODY MASS INDEX: 23.55 KG/M2 | TEMPERATURE: 98.9 F | HEART RATE: 89 BPM | HEIGHT: 69 IN | OXYGEN SATURATION: 100 % | WEIGHT: 159 LBS | RESPIRATION RATE: 20 BRPM | DIASTOLIC BLOOD PRESSURE: 78 MMHG

## 2022-08-01 DIAGNOSIS — F90.0 ATTENTION DEFICIT HYPERACTIVITY DISORDER (ADHD), PREDOMINANTLY INATTENTIVE TYPE: Primary | ICD-10-CM

## 2022-08-01 DIAGNOSIS — F90.0 ATTENTION DEFICIT HYPERACTIVITY DISORDER (ADHD), PREDOMINANTLY INATTENTIVE TYPE: ICD-10-CM

## 2022-08-01 PROCEDURE — 99213 OFFICE O/P EST LOW 20 MIN: CPT

## 2022-08-01 NOTE — PROGRESS NOTES
"Chief Complaint  ADHD    Subjective    History of Present Illness      Patient presents to [facility] for   Pt is being seen today for ADHD 3 mo med check. Pt last UDS 1/31/22 appropriate. Pt when last seen was cont on same. Pt states he is doing well on current medication regimen today and denies any complaints or concerns at this time.     ADHD/Mood HPI    Visit for:  follow-up. Most recent visit was 3 months ago.  Interim changes to follow up on today: no change in medication  Work/School Performance:  going well  Cognitive:  able to focus    Behavior  Hyperactivity: is not hyperactive  Impulsivity: no impulsivity and no unsafe behavior  Tasking: able to initiate tasks, able to complete tasks and able to mult-task    Social  ADHD social/impulsive symptoms:  not impatient, does not blurt out inappropriate comments and no excessive talking    Behavioral health  Behavior: no concerns  Emotional coping: demonstrates feelings of no concerns       Review of Systems   All other systems reviewed and are negative.      I have reviewed and agree with the HPI and ROS information as above.  Karen An, APRN     Objective   Vital Signs:   /78   Pulse 89   Temp 98.9 °F (37.2 °C)   Resp 20   Ht 175.3 cm (69\")   Wt 72.1 kg (159 lb)   SpO2 100%   BMI 23.48 kg/m²     BMI is within normal parameters. No other follow-up for BMI required.      Physical Exam  Constitutional:       Appearance: Normal appearance. He is well-developed.   HENT:      Head: Normocephalic and atraumatic.      Right Ear: External ear normal.      Left Ear: External ear normal.      Nose: Nose normal. No nasal tenderness or congestion.      Mouth/Throat:      Lips: Pink. No lesions.      Mouth: Mucous membranes are moist. No oral lesions.      Dentition: Normal dentition.      Pharynx: Oropharynx is clear. No pharyngeal swelling, oropharyngeal exudate or posterior oropharyngeal erythema.   Eyes:      General: " Lids are normal. Vision grossly intact. No scleral icterus.        Right eye: No discharge.         Left eye: No discharge.      Extraocular Movements: Extraocular movements intact.      Conjunctiva/sclera: Conjunctivae normal.      Right eye: Right conjunctiva is not injected.      Left eye: Left conjunctiva is not injected.      Pupils: Pupils are equal, round, and reactive to light.   Cardiovascular:      Rate and Rhythm: Normal rate and regular rhythm.      Heart sounds: Normal heart sounds. No murmur heard.    No gallop.   Pulmonary:      Effort: Pulmonary effort is normal.      Breath sounds: Normal breath sounds and air entry. No wheezing, rhonchi or rales.   Musculoskeletal:         General: No tenderness or deformity. Normal range of motion.      Cervical back: Full passive range of motion without pain, normal range of motion and neck supple.      Right lower leg: No edema.      Left lower leg: No edema.   Skin:     General: Skin is warm and dry.      Coloration: Skin is not jaundiced.      Findings: No rash.   Neurological:      Mental Status: He is alert and oriented to person, place, and time.      Cranial Nerves: Cranial nerves are intact.      Sensory: Sensation is intact.      Motor: Motor function is intact.      Coordination: Coordination is intact.      Gait: Gait is intact.   Psychiatric:         Attention and Perception: Attention normal.         Mood and Affect: Mood and affect normal.         Behavior: Behavior is not hyperactive. Behavior is cooperative.         Thought Content: Thought content normal.         Judgment: Judgment normal.           Result Review  Data Reviewed:                   Assessment and Plan      Problem List Items Addressed This Visit        Mental Health    Attention deficit hyperactivity disorder (ADHD), predominantly inattentive type - Primary        Patient is seen today for ADHD follow-up.  Patient is doing well on current medication regimen and wishes to continue  same.  UDS is up-to-date Yoni pending.  Continue Vyvanse 50 mg.      Follow Up   Return in about 3 months (around 11/1/2022) for ADHD.  Patient was given instructions and counseling regarding his condition or for health maintenance advice. Please see specific information pulled into the AVS if appropriate.

## 2022-09-28 DIAGNOSIS — F90.0 ATTENTION DEFICIT HYPERACTIVITY DISORDER (ADHD), PREDOMINANTLY INATTENTIVE TYPE: ICD-10-CM

## 2022-11-01 ENCOUNTER — LAB (OUTPATIENT)
Dept: LAB | Facility: HOSPITAL | Age: 32
End: 2022-11-01

## 2022-11-01 DIAGNOSIS — Z51.81 MEDICATION MONITORING ENCOUNTER: ICD-10-CM

## 2022-11-01 DIAGNOSIS — F90.0 ATTENTION DEFICIT HYPERACTIVITY DISORDER (ADHD), PREDOMINANTLY INATTENTIVE TYPE: ICD-10-CM

## 2022-11-01 DIAGNOSIS — F90.0 ATTENTION DEFICIT HYPERACTIVITY DISORDER (ADHD), PREDOMINANTLY INATTENTIVE TYPE: Primary | ICD-10-CM

## 2022-11-01 PROCEDURE — 80306 DRUG TEST PRSMV INSTRMNT: CPT

## 2022-11-02 ENCOUNTER — OFFICE VISIT (OUTPATIENT)
Dept: FAMILY MEDICINE CLINIC | Facility: CLINIC | Age: 32
End: 2022-11-02

## 2022-11-02 VITALS
BODY MASS INDEX: 23.67 KG/M2 | HEIGHT: 69 IN | OXYGEN SATURATION: 98 % | WEIGHT: 159.8 LBS | HEART RATE: 100 BPM | DIASTOLIC BLOOD PRESSURE: 74 MMHG | TEMPERATURE: 99.2 F | SYSTOLIC BLOOD PRESSURE: 111 MMHG

## 2022-11-02 DIAGNOSIS — F90.0 ATTENTION DEFICIT HYPERACTIVITY DISORDER (ADHD), PREDOMINANTLY INATTENTIVE TYPE: Primary | ICD-10-CM

## 2022-11-02 PROCEDURE — 99213 OFFICE O/P EST LOW 20 MIN: CPT

## 2022-11-02 NOTE — PROGRESS NOTES
"Chief Complaint  ADHD    Subjective        Abdi Krishna presents to [facility]  History of Present Illness  3-month follow-up on ADHD, medication working well, no complaints      Objective   Vital Signs:  /74 (BP Location: Right arm, Patient Position: Sitting, Cuff Size: Adult)   Pulse 100   Temp 99.2 °F (37.3 °C) (Temporal)   Ht 175.3 cm (69.02\")   Wt 72.5 kg (159 lb 12.8 oz)   SpO2 98%   BMI 23.59 kg/m²   Estimated body mass index is 23.59 kg/m² as calculated from the following:    Height as of this encounter: 175.3 cm (69.02\").    Weight as of this encounter: 72.5 kg (159 lb 12.8 oz).    BMI is within normal parameters. No other follow-up for BMI required.      Physical Exam  Vitals and nursing note reviewed.   Constitutional:       General: He is not in acute distress.     Appearance: Normal appearance. He is not ill-appearing.   HENT:      Head: Normocephalic and atraumatic.      Right Ear: External ear normal.      Left Ear: External ear normal.      Nose: Nose normal.   Eyes:      Conjunctiva/sclera: Conjunctivae normal.   Cardiovascular:      Rate and Rhythm: Normal rate and regular rhythm.      Pulses: Normal pulses.      Heart sounds: Normal heart sounds.   Pulmonary:      Effort: Pulmonary effort is normal.      Breath sounds: Normal breath sounds.   Skin:     General: Skin is warm and dry.   Neurological:      Mental Status: He is alert and oriented to person, place, and time. Mental status is at baseline.   Psychiatric:         Mood and Affect: Mood normal.         Behavior: Behavior normal.         Thought Content: Thought content normal.         Judgment: Judgment normal.        Result Review :                Assessment and Plan   Diagnoses and all orders for this visit:    1. Attention deficit hyperactivity disorder (ADHD), predominantly inattentive type (Primary)    Patient is seen today following up on ADHD, is taking Vyvanse 50 mg daily, states he is " doing well on this current regimen as his symptoms are well controlled and would like to continue same.  UDS is up-to-date and appropriate, Yoni pending, patient denies HI/SI.  Follow-up in 3 months.    Plan:  1.  Continue Vyvanse 50 mg daily  2.  Follow-up in 3 months         Follow Up   Return in about 3 months (around 2/2/2023) for adhd.  Patient was given instructions and counseling regarding his condition or for health maintenance advice. Please see specific information pulled into the AVS if appropriate.

## 2022-11-02 NOTE — PATIENT INSTRUCTIONS

## 2022-11-03 ENCOUNTER — TELEPHONE (OUTPATIENT)
Dept: FAMILY MEDICINE CLINIC | Facility: CLINIC | Age: 32
End: 2022-11-03

## 2022-11-03 DIAGNOSIS — F90.0 ATTENTION DEFICIT HYPERACTIVITY DISORDER (ADHD), PREDOMINANTLY INATTENTIVE TYPE: ICD-10-CM

## 2022-11-03 NOTE — TELEPHONE ENCOUNTER
Caller: Abdi Krishna    Relationship: Self    Best call back number:[phone]    What is the best time to reach you: ANY    Who are you requesting to speak with (clinical staff, provider,  specific staff member): CLINICAL STAFF        What was the call regarding: THE PATIENT STATES THAT HE WENT TO THE PHARMACY AND STATES THAT THE PHARMACY SAYS THAT THE PATIENT CANNOT GET THE VYVANSE UNTIL THE END OF November. THE PATIENT SAYS THAT HE IS CURRENTLY OUT OF THE VYVANSE AND HAS NOT TAKEN THE MEDICATION SINCE 10/31/22.    Do you require a callback: YES

## 2023-01-04 DIAGNOSIS — F90.0 ATTENTION DEFICIT HYPERACTIVITY DISORDER (ADHD), PREDOMINANTLY INATTENTIVE TYPE: ICD-10-CM

## 2023-01-05 DIAGNOSIS — F90.0 ATTENTION DEFICIT HYPERACTIVITY DISORDER (ADHD), PREDOMINANTLY INATTENTIVE TYPE: ICD-10-CM

## 2023-01-06 DIAGNOSIS — F90.0 ATTENTION DEFICIT HYPERACTIVITY DISORDER (ADHD), PREDOMINANTLY INATTENTIVE TYPE: ICD-10-CM

## 2023-02-13 ENCOUNTER — OFFICE VISIT (OUTPATIENT)
Dept: FAMILY MEDICINE CLINIC | Facility: CLINIC | Age: 33
End: 2023-02-13
Payer: COMMERCIAL

## 2023-02-13 VITALS
BODY MASS INDEX: 22.85 KG/M2 | HEART RATE: 82 BPM | DIASTOLIC BLOOD PRESSURE: 68 MMHG | TEMPERATURE: 97.9 F | WEIGHT: 159.6 LBS | SYSTOLIC BLOOD PRESSURE: 114 MMHG | OXYGEN SATURATION: 99 % | RESPIRATION RATE: 14 BRPM | HEIGHT: 70 IN

## 2023-02-13 DIAGNOSIS — F90.0 ATTENTION DEFICIT HYPERACTIVITY DISORDER (ADHD), PREDOMINANTLY INATTENTIVE TYPE: Primary | ICD-10-CM

## 2023-02-13 PROCEDURE — 99213 OFFICE O/P EST LOW 20 MIN: CPT

## 2023-02-13 NOTE — PROGRESS NOTES
"Chief Complaint  ADHD    Subjective    History of Present Illness      Patient presents to [facility] for   History of Present Illness  Patient here for 3 month ADHD. He states his medication is working well.  ADHD/Mood HPI    Visit for:  follow-up. Most recent visit was 3 months ago.  Interim changes to follow up on today: no change in medication  Work/School Performance:  going well  Cognitive:  able to focus    Behavior  Hyperactivity: is not hyperactive  Impulsivity: no impulsivity  Tasking: able to initiate tasks and able to complete tasks    Social  ADHD social/impulsive symptoms:  not impatient    Behavioral health  Behavior: no concerns  Emotional coping: demonstrates feelings of no concerns.       Review of Systems   All other systems reviewed and are negative.      I have reviewed and agree with the HPI and ROS information as above.  Karen An, APRN     Objective   Vital Signs:   /68   Pulse 82   Temp 97.9 °F (36.6 °C)   Resp 14   Ht 177.8 cm (70\")   Wt 72.4 kg (159 lb 9.6 oz)   SpO2 99%   BMI 22.90 kg/m²     BMI is within normal parameters. No other follow-up for BMI required.      Physical Exam  Constitutional:       Appearance: Normal appearance. He is well-developed.   HENT:      Head: Normocephalic and atraumatic.      Right Ear: External ear normal.      Left Ear: External ear normal.      Nose: Nose normal. No nasal tenderness or congestion.      Mouth/Throat:      Lips: Pink. No lesions.      Mouth: Mucous membranes are moist. No oral lesions.      Dentition: Normal dentition.      Pharynx: Oropharynx is clear. No pharyngeal swelling, oropharyngeal exudate or posterior oropharyngeal erythema.   Eyes:      General: Lids are normal. Vision grossly intact. No scleral icterus.        Right eye: No discharge.         Left eye: No discharge.      Extraocular Movements: Extraocular movements intact.      Conjunctiva/sclera: Conjunctivae normal.      Right " eye: Right conjunctiva is not injected.      Left eye: Left conjunctiva is not injected.      Pupils: Pupils are equal, round, and reactive to light.   Cardiovascular:      Rate and Rhythm: Normal rate and regular rhythm.      Heart sounds: Normal heart sounds. No murmur heard.    No gallop.   Pulmonary:      Effort: Pulmonary effort is normal.      Breath sounds: Normal breath sounds and air entry. No wheezing, rhonchi or rales.   Musculoskeletal:         General: No tenderness or deformity. Normal range of motion.      Cervical back: Full passive range of motion without pain, normal range of motion and neck supple.      Right lower leg: No edema.      Left lower leg: No edema.   Skin:     General: Skin is warm and dry.      Coloration: Skin is not jaundiced.      Findings: No rash.   Neurological:      Mental Status: He is alert and oriented to person, place, and time.      Sensory: Sensation is intact.      Motor: Motor function is intact.      Coordination: Coordination is intact.      Gait: Gait is intact.   Psychiatric:         Attention and Perception: Attention normal.         Mood and Affect: Mood and affect normal.         Behavior: Behavior is not hyperactive. Behavior is cooperative.         Thought Content: Thought content normal.         Judgment: Judgment normal.          BARRY-7:      PHQ-2 Depression Screening  Little interest or pleasure in doing things? 0-->not at all   Feeling down, depressed, or hopeless? 0-->not at all   PHQ-2 Total Score 0     PHQ-9 Depression Screening  Little interest or pleasure in doing things? 0-->not at all   Feeling down, depressed, or hopeless? 0-->not at all   Trouble falling or staying asleep, or sleeping too much?     Feeling tired or having little energy?     Poor appetite or overeating?     Feeling bad about yourself - or that you are a failure or have let yourself or your family down?     Trouble concentrating on things, such as reading the newspaper or watching  television?     Moving or speaking so slowly that other people could have noticed? Or the opposite - being so fidgety or restless that you have been moving around a lot more than usual?     Thoughts that you would be better off dead, or of hurting yourself in some way?     PHQ-9 Total Score 0   If you checked off any problems, how difficult have these problems made it for you to do your work, take care of things at home, or get along with other people?        Result Review  Data Reviewed:                   Assessment and Plan      Diagnoses and all orders for this visit:    1. Attention deficit hyperactivity disorder (ADHD), predominantly inattentive type (Primary)      Patient is seen today for adhd. Patient is doing well on current medication regimen and wishes to continue same.     Plan  1. ADHD stable with Vyvanse 50mg; uds is jermaine spears pending       Follow Up   No follow-ups on file.  Patient was given instructions and counseling regarding his condition or for health maintenance advice. Please see specific information pulled into the AVS if appropriate.

## 2023-04-13 DIAGNOSIS — F90.0 ATTENTION DEFICIT HYPERACTIVITY DISORDER (ADHD), PREDOMINANTLY INATTENTIVE TYPE: ICD-10-CM

## 2023-04-13 NOTE — TELEPHONE ENCOUNTER
Pt last seen 23 and okay'd to return in 3 mo. Last UDS 22 appropriate per ESTEBAN Hernandez. Pt due for 3rd rx of vyvanse 50mg. Contacted pt back, verified name and . Advised routing to Dr. Santos and would see next mo for follow up. Pt vu and thanked staff. Verified preferred pharmacy.

## 2023-05-24 ENCOUNTER — OFFICE VISIT (OUTPATIENT)
Dept: FAMILY MEDICINE CLINIC | Facility: CLINIC | Age: 33
End: 2023-05-24
Payer: COMMERCIAL

## 2023-05-24 VITALS
SYSTOLIC BLOOD PRESSURE: 110 MMHG | WEIGHT: 157 LBS | HEIGHT: 70 IN | BODY MASS INDEX: 22.48 KG/M2 | HEART RATE: 92 BPM | DIASTOLIC BLOOD PRESSURE: 75 MMHG | RESPIRATION RATE: 20 BRPM

## 2023-05-24 DIAGNOSIS — T14.8XXA PULLED MUSCLE: ICD-10-CM

## 2023-05-24 DIAGNOSIS — F90.0 ATTENTION DEFICIT HYPERACTIVITY DISORDER (ADHD), PREDOMINANTLY INATTENTIVE TYPE: Primary | ICD-10-CM

## 2023-05-24 NOTE — PROGRESS NOTES
"Chief Complaint  ADHD and pulled muscle    Subjective    History of Present Illness      Patient presents to [facility] for   History of Present Illness  Pt is here for 3 month med check and states he feels like his med wearing off too soon in the day. He also mentioned he thinks he pulled a muscle in his left pec area when he was at work 2-3 weeks ago.,  Pt reports it only bothers him when he sneezes    ADHD/Mood HPI    Visit for:  follow-up. Most recent visit was 3 months ago.  Interim changes to follow up on today: no change in medication  Work/School Performance:  going well, but wearing off earlier in his day  Cognitive:  able to focus    Behavior  Hyperactivity: is not hyperactive  Impulsivity: no impulsivity and no unsafe behavior  Tasking: able to initiate tasks, able to complete tasks, and able to mult-task earlier in the day    Social  ADHD social/impulsive symptoms:  not impatient, does not blurt out inappropriate comments, and no excessive talking    Behavioral health  Behavior: no concerns  Emotional coping: demonstrates feelings of no concerns         Review of Systems    I have reviewed and agree with the HPI information as above.  Carol Glover, APRN     Objective   Vital Signs:   /75   Pulse 92   Resp 20   Ht 177.8 cm (70\")   Wt 71.2 kg (157 lb)   BMI 22.53 kg/m²     BMI is within normal parameters. No other follow-up for BMI required.      Physical Exam     BARRY-7:      PHQ-2 Depression Screening  Little interest or pleasure in doing things?     Feeling down, depressed, or hopeless?     PHQ-2 Total Score       PHQ-9 Depression Screening  Little interest or pleasure in doing things?     Feeling down, depressed, or hopeless?     Trouble falling or staying asleep, or sleeping too much?     Feeling tired or having little energy?     Poor appetite or overeating?     Feeling bad about yourself - or that you are a failure or have let yourself or your family " down?     Trouble concentrating on things, such as reading the newspaper or watching television?     Moving or speaking so slowly that other people could have noticed? Or the opposite - being so fidgety or restless that you have been moving around a lot more than usual?     Thoughts that you would be better off dead, or of hurting yourself in some way?     PHQ-9 Total Score     If you checked off any problems, how difficult have these problems made it for you to do your work, take care of things at home, or get along with other people?        Result Review  Data Reviewed:                   Assessment and Plan      Diagnoses and all orders for this visit:    1. Attention deficit hyperactivity disorder (ADHD), predominantly inattentive type (Primary)    2. Pulled muscle    Patient states that the Vyvanse just does not seem to be lasting long enough. Discussed increasing the dose vs adding an afternoon dose of Adderall and he has decided to increase the dose at this time.   Patient also states that he works for [employer] and thinks that he pulled his brannon muscle. He did not notice at first, but when he sneezes it hurts. The pain is reproducible, very unlikely it is cardiac related. VSS. Offered a steroid pack and he has declined at this time. Encouraged to use ibuprofen and stretch.   ADHD Follow up:    PDMP reviewed and is clean. ADRS (Adult ADHD Assessment Form) reviewed in detail, scanned in chart, and has been reviewed at time of appointment.  All questions, including medication and side effects, were discussed in detail at time of patient's visit. Patient will change medication dose which was discussed at today's visit. Patient is to return in 1 month(s).    Last Urine Toxicity           Latest Ref Rng & Units 11/1/2022 1/31/2022   LAST URINE TOXICITY RESULTS   Amphetamine, Urine Qual Negative Positive   Positive     Barbiturates Screen, Urine Negative Negative   Negative     Benzodiazepine Screen, Urine Negative  Negative   Negative     Buprenorphine, Screen, Urine Negative Negative   Negative     Cocaine Screen, Urine Negative Negative   Negative     Methadone Screen , Urine Negative Negative   Negative     Methamphetamine, Ur Negative Negative   Negative                   Urine Drug Screen Results: UDS RESULTS: Current results appropriate        Follow Up   Return in about 1 month (around 6/24/2023) for ADHD follow up.  Patient was given instructions and counseling regarding his condition or for health maintenance advice. Please see specific information pulled into the AVS if appropriate.

## 2023-06-17 ENCOUNTER — TELEPHONE (OUTPATIENT)
Dept: FAMILY MEDICINE CLINIC | Facility: CLINIC | Age: 33
End: 2023-06-17
Payer: COMMERCIAL

## 2023-06-19 ENCOUNTER — TELEPHONE (OUTPATIENT)
Dept: FAMILY MEDICINE CLINIC | Facility: CLINIC | Age: 33
End: 2023-06-19
Payer: COMMERCIAL

## 2023-06-19 ENCOUNTER — TELEPHONE (OUTPATIENT)
Dept: FAMILY MEDICINE CLINIC | Facility: CLINIC | Age: 33
End: 2023-06-19

## 2023-06-19 ENCOUNTER — OFFICE VISIT (OUTPATIENT)
Dept: FAMILY MEDICINE CLINIC | Facility: CLINIC | Age: 33
End: 2023-06-19
Payer: COMMERCIAL

## 2023-06-19 VITALS
HEART RATE: 79 BPM | HEIGHT: 70 IN | OXYGEN SATURATION: 100 % | SYSTOLIC BLOOD PRESSURE: 129 MMHG | WEIGHT: 159 LBS | BODY MASS INDEX: 22.76 KG/M2 | DIASTOLIC BLOOD PRESSURE: 88 MMHG

## 2023-06-19 DIAGNOSIS — F90.0 ATTENTION DEFICIT HYPERACTIVITY DISORDER (ADHD), PREDOMINANTLY INATTENTIVE TYPE: ICD-10-CM

## 2023-06-19 DIAGNOSIS — Z00.00 WELL ADULT EXAM: Primary | ICD-10-CM

## 2023-06-19 PROCEDURE — 99395 PREV VISIT EST AGE 18-39: CPT | Performed by: PEDIATRICS

## 2023-06-19 NOTE — TELEPHONE ENCOUNTER
Caller: IRAM RODAS     Relationship: SELF     Best call back number: [phone]    What is the best time to reach you: ANYTIME     Who are you requesting to speak with (clinical staff, provider,  specific staff member):      Do you know the name of the person who called: STATES  SCHEDULING     What was the call regarding: STATES HE RECEIVED A CALL ABOUT HIS APPOINTMENT STATING HE NEEDED TO RESCHEDULE HIS PHYSICAL HE HAS RESCHEDULED AND STATES HE IS UNSURE IF THE PROVIDER WILL BE IN   MY TEMPLATE WAS ABLE TO SCHEDULE WITH MADONNA MOREL AND HE IS REQUESTING A CALL BACK TO VERIFY     Is it okay if the provider responds through [employer] [facility] REQUESTING CALL BACK

## 2023-06-19 NOTE — TELEPHONE ENCOUNTER
----- Message from Margarito Santos MD sent at 6/19/2023  4:37 PM CDT -----  Lab was entirely normal

## 2023-06-19 NOTE — PROGRESS NOTES
"Chief Complaint  ADHD and Annual Exam    Subjective    History of Present Illness      Patient presents to [facility] for   History of Present Illness  Pt is here for 1 month ADHD after increasing Vyvanse and for Annual Exam. Pt reports no problems or concerns at this time.       ADHD/Mood HPI    Visit for:  follow-up. Most recent visit was 1 month ago.  Interim changes to follow up on today: no change in medication  Work/School Performance:  going well  Cognitive:  able to focus    Behavior  Hyperactivity: is not hyperactive  Impulsivity: no impulsivity  Tasking: able to initiate tasks and able to complete tasks    Social  ADHD social/impulsive symptoms:  not impatient and no excessive talking    Behavioral health  Behavior: no concerns  Emotional coping: demonstrates feelings of no concerns         Review of Systems    I have reviewed and agree with the HPI information as above.  Tim Rubin MA     Objective   Vital Signs:   Pulse 79   Ht 177.8 cm (70\")   Wt 72.1 kg (159 lb)   SpO2 100%   BMI 22.81 kg/m²     BMI is within normal parameters. No other follow-up for BMI required.      Physical Exam     BARRY-7:      PHQ-2 Depression Screening  Little interest or pleasure in doing things?     Feeling down, depressed, or hopeless?     PHQ-2 Total Score       PHQ-9 Depression Screening  Little interest or pleasure in doing things?     Feeling down, depressed, or hopeless?     Trouble falling or staying asleep, or sleeping too much?     Feeling tired or having little energy?     Poor appetite or overeating?     Feeling bad about yourself - or that you are a failure or have let yourself or your family down?     Trouble concentrating on things, such as reading the newspaper or watching television?     Moving or speaking so slowly that other people could have noticed? Or the opposite - being so fidgety or restless that you have been moving around a lot more than usual?     Thoughts that " you would be better off dead, or of hurting yourself in some way?     PHQ-9 Total Score     If you checked off any problems, how difficult have these problems made it for you to do your work, take care of things at home, or get along with other people?        Result Review  Data Reviewed:                   Assessment and Plan      There are no diagnoses linked to this encounter.        Follow Up   No follow-ups on file.  Patient was given instructions and counseling regarding his condition or for health maintenance advice. Please see specific information pulled into the AVS if appropriate.

## 2023-07-24 DIAGNOSIS — F90.0 ATTENTION DEFICIT HYPERACTIVITY DISORDER (ADHD), PREDOMINANTLY INATTENTIVE TYPE: ICD-10-CM

## 2023-08-28 DIAGNOSIS — F90.0 ATTENTION DEFICIT HYPERACTIVITY DISORDER (ADHD), PREDOMINANTLY INATTENTIVE TYPE: ICD-10-CM

## 2023-08-31 ENCOUNTER — TELEPHONE (OUTPATIENT)
Dept: FAMILY MEDICINE CLINIC | Facility: CLINIC | Age: 33
End: 2023-08-31
Payer: COMMERCIAL

## 2023-08-31 DIAGNOSIS — F90.0 ATTENTION DEFICIT HYPERACTIVITY DISORDER (ADHD), PREDOMINANTLY INATTENTIVE TYPE: Primary | ICD-10-CM

## 2023-08-31 NOTE — TELEPHONE ENCOUNTER
Caller: Abdi Krishna    Relationship: Self    Best call back number: [phone]     Requested Prescriptions:   Requested Prescriptions     Pending Prescriptions Disp Refills    lisdexamfetamine (Vyvanse) 60 MG capsule 30 capsule 0     Sig: Take 1 capsule by mouth Every Morning        Pharmacy where request should be sent: [address] - [phone] PH - [phone] FX     Last office visit with prescribing clinician: 6/19/2023   Last telemedicine visit with prescribing clinician: Visit date not found   Next office visit with prescribing clinician: 9/19/2023     Additional details provided by patient: WOULD LIKE SEE IF HE COULD GET THE LOWER DOSAGE OF 50 MG 1 TIME DUE TO THE SHORTAGE OF THE 60 MG. PHARMACY ADVISED THEY COULD FILL 50 MG BUT NOT 60 MG    Does the patient have less than a 3 day supply:  [x] Yes  [] No    Silvestre Castañeda III, RegSched Rep   08/31/23 14:11 CDT

## 2023-08-31 NOTE — TELEPHONE ENCOUNTER
Called [facility] to cancel 60mg Vyvanse prescription. Sent 50mg Vyvanse prescription to Dr. Santos. Advised pt of the above.

## 2023-09-19 ENCOUNTER — OFFICE VISIT (OUTPATIENT)
Dept: FAMILY MEDICINE CLINIC | Facility: CLINIC | Age: 33
End: 2023-09-19
Payer: COMMERCIAL

## 2023-09-19 VITALS
RESPIRATION RATE: 20 BRPM | DIASTOLIC BLOOD PRESSURE: 71 MMHG | WEIGHT: 162 LBS | BODY MASS INDEX: 23.19 KG/M2 | HEART RATE: 81 BPM | HEIGHT: 70 IN | SYSTOLIC BLOOD PRESSURE: 113 MMHG

## 2023-09-19 DIAGNOSIS — F90.0 ATTENTION DEFICIT HYPERACTIVITY DISORDER (ADHD), PREDOMINANTLY INATTENTIVE TYPE: Primary | ICD-10-CM

## 2023-09-19 PROCEDURE — 99213 OFFICE O/P EST LOW 20 MIN: CPT | Performed by: PEDIATRICS

## 2023-09-19 NOTE — PROGRESS NOTES
"Chief Complaint  ADHD    Subjective    History of Present Illness      Patient presents to [facility] for   History of Present Illness  ADHD/Mood HPI    Visit for:  follow-up. Most recent visit was 3 months ago.  Interim changes to follow up on today: no change in medication  Work/School Performance:  going well  Cognitive:  able to focus but wearing off too soon    Behavior  Hyperactivity: is not hyperactive  Impulsivity: no impulsivity and no unsafe behavior  Tasking: able to initiate tasks, able to complete tasks, and able to mult-task    Social  ADHD social/impulsive symptoms:  not impatient, does not blurt out inappropriate comments, and no excessive talking    Behavioral health  Behavior: no concerns  Emotional coping: demonstrates feelings of no concerns         Review of Systems    I have reviewed and agree with the HPI information as above.  Margarito Santos MD     Objective   Vital Signs:   /71   Pulse 81   Resp 20   Ht 177.8 cm (70\")   Wt 73.5 kg (162 lb)   BMI 23.24 kg/m²     BMI is within normal parameters. No other follow-up for BMI required.      Physical Exam  Constitutional:       Appearance: Normal appearance. He is normal weight.   Cardiovascular:      Rate and Rhythm: Normal rate and regular rhythm.      Heart sounds: Normal heart sounds.   Pulmonary:      Effort: Pulmonary effort is normal.      Breath sounds: Normal breath sounds.   Neurological:      Mental Status: He is alert.   Psychiatric:         Mood and Affect: Mood normal.         Behavior: Behavior normal.        BARRY-7:      PHQ-2 Depression Screening  Little interest or pleasure in doing things? 0-->not at all   Feeling down, depressed, or hopeless? 0-->not at all   PHQ-2 Total Score 0     PHQ-9 Depression Screening  Little interest or pleasure in doing things? 0-->not at all   Feeling down, depressed, or hopeless? 0-->not at all   Trouble falling or staying asleep, or sleeping too much?   "   Feeling tired or having little energy?     Poor appetite or overeating?     Feeling bad about yourself - or that you are a failure or have let yourself or your family down?     Trouble concentrating on things, such as reading the newspaper or watching television?     Moving or speaking so slowly that other people could have noticed? Or the opposite - being so fidgety or restless that you have been moving around a lot more than usual?     Thoughts that you would be better off dead, or of hurting yourself in some way?     PHQ-9 Total Score 0   If you checked off any problems, how difficult have these problems made it for you to do your work, take care of things at home, or get along with other people?        Result Review  Data Reviewed:              Urine Drug Screen - Urine, Clean Catch (11/01/2022 16:28)      Assessment and Plan      Diagnoses and all orders for this visit:    1. Attention deficit hyperactivity disorder (ADHD), predominantly inattentive type (Primary)  Assessment & Plan:  Psychological condition is improving with treatment.  Continue current treatment regimen.  Psychological condition  will be reassessed in 3 months    Will watch may need to go back to 60 if next prescription seems to quit working.    Orders:  -     lisdexamfetamine (Vyvanse) 50 MG capsule; Take 1 capsule by mouth Every Morning  Dispense: 30 capsule; Refill: 0            Follow Up   No follow-ups on file.  Patient was given instructions and counseling regarding his condition or for health maintenance advice. Please see specific information pulled into the AVS if appropriate.     Answers submitted by the patient for this visit:  Primary Reason for Visit (Submitted on 9/18/2023)  What is the primary reason for your visit?: Other  Other (Submitted on 9/18/2023)  Please describe your symptoms.: Vyvanse 3 month  Have you had these symptoms before?: No  How long have you been having these symptoms?: 1-4 days  Please list any  medications you are currently taking for this condition.: Johanna  Please describe any probable cause for these symptoms. : Nothing wrong

## 2023-10-02 NOTE — TELEPHONE ENCOUNTER
Caller: Erendira Abdi L    Relationship: Self    Best call back number: [phone]    Requested Prescriptions:   Requested Prescriptions     Pending Prescriptions Disp Refills   • lisdexamfetamine (Vyvanse) 50 MG capsule 30 capsule 0     Sig: Take 1 capsule by mouth Every Morning for 30 days        Pharmacy where request should be sent: [address] - [phone]  - [phone] FX     Last office visit with prescribing clinician: Visit date not found   Last telemedicine visit with prescribing clinician: Visit date not found   Next office visit with prescribing clinician: Visit date not found     Additional details provided by patient: COMPLETELY OUT     Does the patient have less than a 3 day supply:  [x] Yes  [] No    Would you like a call back once the refill request has been completed: [x] Yes [] No    If the office needs to give you a call back, can they leave a voicemail: [x] Yes [] No    Trae Mahmood Rep   04/13/23 10:53 CDT          History  Chief Complaint   Patient presents with   • Chest Pain     Pt began having CP at 2100. Pt got ntg x2 with no relief. Got 324 ASA. Pt has pacer. Pt denies N/V/ or diaphoresis. 20ga LAC       History provided by:  Medical records, patient and EMS personnel  Chest Pain  Pain location:  Substernal area  Pain quality: pressure    Pain radiates to:  Does not radiate  Pain radiates to the back: no    Pain severity:  Moderate  Onset quality:  Sudden  Duration:  3 hours  Timing:  Constant  Progression:  Unchanged  Chronicity:  Recurrent (Similar chest pains)  Context comment:  Patient states she was sitting down watching the [employer] channel when she developed substernal chest pressure at 9 PM, constant since, called EMS, given aspirin and nitroglycerin. Relieved by:  Nothing  Worsened by:  Nothing tried  Ineffective treatments:  Aspirin and nitroglycerin  Associated symptoms: no abdominal pain, no back pain, no cough, no dizziness, no fatigue, no fever, no headache, no nausea, no palpitations, no shortness of breath, not vomiting and no weakness        Prior to Admission Medications   Prescriptions Last Dose Informant Patient Reported? Taking?    Zinc 25 MG TABS  Self Yes No   Sig: Take 1 tablet by mouth daily   acetaminophen (TYLENOL) 325 mg tablet  Self No No   Sig: Take 2 tablets (650 mg total) by mouth every 6 (six) hours as needed for mild pain, headaches or fever   ascorbic acid (VITAMIN C) 250 mg tablet  Self Yes No   Sig: Take 500 mg by mouth daily   aspirin (ECOTRIN LOW STRENGTH) 81 mg EC tablet  Self No No   Sig: Take 1 tablet (81 mg total) by mouth daily   Patient not taking: Reported on 2023   cholecalciferol (VITAMIN D3) 1,000 units tablet  Self No No   Sig: Take 2 tablets (2,000 Units total) by mouth daily   fluticasone (FLONASE) 50 mcg/act nasal spray  Self No No   Si spray each nostril 2 x daily prn congestion   levothyroxine 150 mcg tablet  Self No No   Sig: Take 1 tablet (150 mcg total) by mouth daily in the early morning   loratadine (CLARITIN) 10 mg tablet  Self No No   Si tab po daily x 14 days then prn allergies   magnesium oxide (MAG-OX) 400 mg  Self No No   Sig: Take 1 tablet (400 mg total) by mouth daily at bedtime   Patient not taking: Reported on 2023   vitamin B-12 (VITAMIN B-12) 1,000 mcg tablet  Self No No   Sig: Take 1 tablet (1,000 mcg total) by mouth 3 (three) times a week      Facility-Administered Medications: None       Past Medical History:   Diagnosis Date   • Allergic rhinitis    • Carpal tunnel syndrome on both sides    • Cervical disc herniation    • DVT (deep venous thrombosis) ([address])     DVT Left Leg   • Fracture of foot     left casted   • History of mammogram     Being followed by Dr. Niall George   • History of Papanicolaou smear of cervix     Dr. Niall George   • Hx of colonoscopy     Dr. Ottoniel Grady   • Hyperlipemia    • Hypertension    • Hypothyroidism    • Lichen sclerosus    • Renal calculi    • Rotator cuff tear, left        Past Surgical History:   Procedure Laterality Date   • CERVICAL LAMINECTOMY N/A    • CHOLECYSTECTOMY N/A    • KNEE CARTILAGE SURGERY Bilateral    • LITHOTRIPSY N/A    • NEUROPLASTY / TRANSPOSITION MEDIAN NERVE AT CARPAL TUNNEL BILATERAL Bilateral    • ROTATOR CUFF REPAIR Left    • TONSILLECTOMY AND ADENOIDECTOMY Bilateral    • TONSILLECTOMY AND ADENOIDECTOMY     • TUBAL LIGATION N/A    • US GUIDED THYROID BIOPSY  2018   • WRIST FRACTURE SURGERY Right     plates and pins        Family History   Problem Relation Age of Onset   • Hypertension Mother    • Hyperlipidemia Mother    • Hypothyroidism Mother    • Brain cancer Mother    • Hypertension Father    • Celiac disease Father    • Ovarian cancer Sister    • Celiac disease Brother    • Hypothyroidism Brother    • Diabetes Paternal Grandmother    • Hypothyroidism Sister    • Coronary artery disease Brother    • Hypertension Brother    • Hyperlipidemia Brother    • Diabetes Sister      I have reviewed and agree with the history as documented. E-Cigarette/Vaping   • E-Cigarette Use Never User      E-Cigarette/Vaping Substances   • Nicotine No    • THC No    • CBD No    • Flavoring No    • Other No    • Unknown No      Social History     Tobacco Use   • Smoking status: Every Day     Packs/day: 0.50     Years: 44.00     Total pack years: 22.00     Types: Cigarettes   • Smokeless tobacco: Never   • Tobacco comments:     8/25/23-smoke 5 cigarettes daily. Vaping Use   • Vaping Use: Never used   Substance Use Topics   • Alcohol use: No   • Drug use: No       Review of Systems   Constitutional: Negative for chills, fatigue and fever. HENT: Negative for ear discharge, ear pain, rhinorrhea and sore throat. Eyes: Negative for pain and visual disturbance. Respiratory: Negative for cough and shortness of breath. Cardiovascular: Positive for chest pain. Negative for palpitations. Gastrointestinal: Negative for abdominal pain, diarrhea, nausea and vomiting. Endocrine: Negative for polydipsia, polyphagia and polyuria. Genitourinary: Negative for difficulty urinating, dysuria, flank pain and hematuria. Musculoskeletal: Negative for arthralgias and back pain. Skin: Negative for color change and rash. Allergic/Immunologic: Negative for immunocompromised state. Neurological: Negative for dizziness, seizures, syncope, weakness and headaches. Psychiatric/Behavioral: Negative for confusion and self-injury. The patient is not nervous/anxious. All other systems reviewed and are negative. Physical Exam  Physical Exam  Vitals and nursing note reviewed. Constitutional:       General: She is not in acute distress. Appearance: Normal appearance. She is obese. She is not ill-appearing, toxic-appearing or diaphoretic. HENT:      Head: Normocephalic and atraumatic. Nose: Nose normal. No congestion or rhinorrhea.       Mouth/Throat:      Mouth: Mucous membranes are moist.      Pharynx: Oropharynx is clear. No oropharyngeal exudate or posterior oropharyngeal erythema. Eyes:      General:         Right eye: No discharge. Left eye: No discharge. Cardiovascular:      Rate and Rhythm: Normal rate and regular rhythm. Pulses: Normal pulses. Heart sounds: Normal heart sounds. No murmur heard. No gallop. Comments: Pacemaker left chest wall, no overlying erythema or tenderness  Pulmonary:      Effort: Pulmonary effort is normal. No respiratory distress. Breath sounds: Normal breath sounds. No stridor. No wheezing, rhonchi or rales. Chest:      Chest wall: Tenderness present. Comments: Mild tenderness to palpation of the anterior chest, no crepitus  Abdominal:      General: Bowel sounds are normal. There is no distension. Palpations: Abdomen is soft. There is no mass. Tenderness: There is no abdominal tenderness. There is no right CVA tenderness, left CVA tenderness, guarding or rebound. Hernia: No hernia is present. Musculoskeletal:         General: Normal range of motion. Cervical back: Normal range of motion and neck supple. Skin:     General: Skin is warm and dry. Capillary Refill: Capillary refill takes less than 2 seconds. Neurological:      General: No focal deficit present. Mental Status: She is alert and oriented to person, place, and time. Cranial Nerves: No cranial nerve deficit. Sensory: No sensory deficit. Motor: No weakness. Coordination: Coordination normal.      Gait: Gait normal.      Deep Tendon Reflexes: Reflexes normal.   Psychiatric:         Mood and Affect: Mood normal.         Behavior: Behavior normal.         Thought Content:  Thought content normal.         Judgment: Judgment normal.         Vital Signs  ED Triage Vitals   Temperature Pulse Respirations Blood Pressure SpO2   10/01/23 2323 10/01/23 2323 10/01/23 2323 10/01/23 2323 10/01/23 2323   98.5 °F (36.9 °C) 91 21 140/61 96 % Temp Source Heart Rate Source Patient Position - Orthostatic VS BP Location FiO2 (%)   10/01/23 2323 10/01/23 2323 10/01/23 2323 10/01/23 2323 --   Temporal Monitor Sitting Left arm       Pain Score       10/01/23 2325       7           Vitals:    10/01/23 2323 10/02/23 0015 10/02/23 0045   BP: 140/61     Pulse: 91 69 70   Patient Position - Orthostatic VS: Sitting           Visual Acuity      ED Medications  Medications - No data to display    Diagnostic Studies  Results Reviewed     Procedure Component Value Units Date/Time    HS Troponin I 2hr [[id number]]  (Normal) Collected: 10/02/23 0101    Lab Status: Final result Specimen: Blood from Arm, Left Updated: 10/02/23 0147     hs TnI 2hr 4 ng/L      Delta 2hr hsTnI 1 ng/L     HS Troponin I 4hr [[id number]]     Lab Status: No result Specimen: Blood     HS Troponin 0hr (reflex protocol) [[id number]]  (Normal) Collected: 10/01/23 2331    Lab Status: Final result Specimen: Blood from Arm, Left Updated: 10/02/23 0002     hs TnI 0hr 3 ng/L     Comprehensive metabolic panel [[id number]] Collected: 10/01/23 2331    Lab Status: Final result Specimen: Blood from Arm, Left Updated: 10/01/23 2358     Sodium 138 mmol/L      Potassium 3.9 mmol/L      Chloride 107 mmol/L      CO2 25 mmol/L      ANION GAP 6 mmol/L      BUN 17 mg/dL      Creatinine 0.78 mg/dL      Glucose 101 mg/dL      Calcium 8.8 mg/dL      AST 14 U/L      ALT 11 U/L      Alkaline Phosphatase 66 U/L      Total Protein 7.0 g/dL      Albumin 3.7 g/dL      Total Bilirubin 0.38 mg/dL      eGFR 77 ml/min/1.73sq m     Narrative:      [place] guidelines for Chronic Kidney Disease (CKD):   •  Stage 1 with normal or high GFR (GFR > 90 mL/min/1.73 square meters)  •  Stage 2 Mild CKD (GFR = 60-89 mL/min/1.73 square meters)  •  Stage 3A Moderate CKD (GFR = 45-59 mL/min/1.73 square meters)  •  Stage 3B Moderate CKD (GFR = 30-44 mL/min/1.73 square meters)  •  Stage 4 Severe CKD (GFR = 15-29 mL/min/1.73 square meters)  •  Stage 5 End Stage CKD (GFR <15 mL/min/1.73 square meters)  Note: GFR calculation is accurate only with a steady state creatinine    CBC and differential [[id number]]  (Abnormal) Collected: 10/01/23 2331    Lab Status: Final result Specimen: Blood from Arm, Left Updated: 10/01/23 2340     WBC 19.34 Thousand/uL      RBC 4.50 Million/uL      Hemoglobin 13.9 g/dL      Hematocrit 44.1 %      MCV 98 fL      MCH 30.9 pg      MCHC 31.5 g/dL      RDW 14.6 %      MPV 10.6 fL      Platelets 715 Thousands/uL      nRBC 0 /100 WBCs      Neutrophils Relative 74 %      Immat GRANS % 1 %      Lymphocytes Relative 16 %      Monocytes Relative 7 %      Eosinophils Relative 1 %      Basophils Relative 1 %      Neutrophils Absolute 14.42 Thousands/µL      Immature Grans Absolute 0.09 Thousand/uL      Lymphocytes Absolute 3.05 Thousands/µL      Monocytes Absolute 1.42 Thousand/µL      Eosinophils Absolute 0.25 Thousand/µL      Basophils Absolute 0.11 Thousands/µL                  XR chest 1 view portable   ED Interpretation by Jess Blanchard MD (10/02 0118)   No acute pathology                 Procedures  Procedures         ED Course             HEART Risk Score    Flowsheet Row Most Recent Value   Heart Score Risk Calculator    History 0 Filed at: 10/02/2023 0152   ECG 0 Filed at: 10/02/2023 0152   Age 2 Filed at: 10/02/2023 0152   Risk Factors 1 Filed at: 10/02/2023 0152   Troponin 0 Filed at: 10/02/2023 0152   HEART Score 3 Filed at: 10/02/2023 8485                        SBIRT 22yo+    Flowsheet Row Most Recent Value   Initial Alcohol Screen: US AUDIT-C     1. How often do you have a drink containing alcohol? 0 Filed at: 10/01/2023 2339   2. How many drinks containing alcohol do you have on a typical day you are drinking? 0 Filed at: 10/01/2023 2339   3b. FEMALE Any Age, or MALE 65+: How often do you have 4 or more drinks on one occassion?  0 Filed at: 10/01/2023 2339   Audit-C Score 0 Filed at: 10/01/2023 2339   OSCAR: How many times in the past year have you. .. Used an illegal drug or used a prescription medication for non-medical reasons? Never Filed at: 10/01/2023 2339                    Medical Decision Making  2320: Patient appears well, vital signs reviewed. EKG nonischemic. Placed on the monitor. Plan to complete basic labs including cardiac enzymes, check chest x-ray. Patient was given aspirin and nitroglycerin prehospital.    0010: Chest x-ray and labs reviewed. Plan to complete T2 and make disposition. Chest pain, unspecified type: acute illness or injury  Amount and/or Complexity of Data Reviewed  External Data Reviewed: labs, radiology and ECG. Labs: ordered. Radiology: ordered and independent interpretation performed. Details: Chest x-ray no acute pathology  ECG/medicine tests: ordered and independent interpretation performed. Details: Atrial paced rhythm 82 bpm, no acute ischemia          Disposition  Final diagnoses:   Chest pain, unspecified type     Time reflects when diagnosis was documented in both MDM as applicable and the Disposition within this note     Time User Action Codes Description Comment    10/2/2023  1:51 AM Beverley Salcido Add [R07.9] Chest pain, unspecified type       ED Disposition     ED Disposition   Discharge    Condition   Stable    Date/Time   Mon Oct 2, 2023  1:51 AM    Comment   Earnest Cart discharge to home/self care.                Follow-up Information     Follow up With Specialties Details Why Contact Info    Matilda Estrada, Nurse Practitioner Schedule an appointment as soon as possible for a visit   [address]  [phone]            Discharge Medication List as of 10/2/2023  2:29 AM      CONTINUE these medications which have NOT CHANGED    Details   acetaminophen (TYLENOL) 325 mg tablet Take 2 tablets (650 mg total) by mouth every 6 (six) hours as needed for mild pain, headaches or fever, Starting Sat 3/11/2023, No Print      ascorbic acid (VITAMIN C) 250 mg tablet Take 500 mg by mouth daily, Historical Med      aspirin (ECOTRIN LOW STRENGTH) 81 mg EC tablet Take 1 tablet (81 mg total) by mouth daily, Starting Tue 6/30/2020, No Print      cholecalciferol (VITAMIN D3) 1,000 units tablet Take 2 tablets (2,000 Units total) by mouth daily, Starting Mon 4/18/2022, No Print      fluticasone (FLONASE) 50 mcg/act nasal spray 1 spray each nostril 2 x daily prn congestion, Normal      levothyroxine 150 mcg tablet Take 1 tablet (150 mcg total) by mouth daily in the early morning, Starting Mon 4/24/2023, Normal      loratadine (CLARITIN) 10 mg tablet 1 tab po daily x 14 days then prn allergies, Normal      magnesium oxide (MAG-OX) 400 mg Take 1 tablet (400 mg total) by mouth daily at bedtime, Starting Mon 1/27/2020, Normal      vitamin B-12 (VITAMIN B-12) 1,000 mcg tablet Take 1 tablet (1,000 mcg total) by mouth 3 (three) times a week, Starting Mon 4/24/2023, Normal      Zinc 25 MG TABS Take 1 tablet by mouth daily, Historical Med             No discharge procedures on file.     PDMP Review       Value Time User    PDMP Reviewed  Yes 6/10/2022  4:57 PM Melissa Phillips          ED Provider  Electronically Signed by           Leslee Kiran MD  10/02/23 3972

## 2023-10-30 DIAGNOSIS — F90.0 ATTENTION DEFICIT HYPERACTIVITY DISORDER (ADHD), PREDOMINANTLY INATTENTIVE TYPE: ICD-10-CM

## 2023-10-30 RX ORDER — LISDEXAMFETAMINE DIMESYLATE CAPSULES 50 MG/1
50 CAPSULE ORAL EVERY MORNING
Qty: 30 CAPSULE | Refills: 0 | Status: SHIPPED | OUTPATIENT
Start: 2023-10-30

## 2023-10-30 NOTE — TELEPHONE ENCOUNTER
Caller: Abdi Krishna    Relationship: Self    Best call back number: [phone]    Requested Prescriptions:   Requested Prescriptions     Pending Prescriptions Disp Refills    lisdexamfetamine (Vyvanse) 50 MG capsule 30 capsule 0     Sig: Take 1 capsule by mouth Every Morning        Pharmacy where request should be sent: [address] - [phone] PH - [phone] FX     Last office visit with prescribing clinician: 9/19/2023   Last telemedicine visit with prescribing clinician: Visit date not found   Next office visit with prescribing clinician: 12/19/2023     Does the patient have less than a 3 day supply:  [x] Yes  [] No    Would you like a call back once the refill request has been completed: [x] Yes [] No    If the office needs to give you a call back, can they leave a voicemail: [x] Yes [] No    Trae Hayden Rep   10/30/23 10:43 CDT

## 2023-12-01 DIAGNOSIS — F90.0 ATTENTION DEFICIT HYPERACTIVITY DISORDER (ADHD), PREDOMINANTLY INATTENTIVE TYPE: ICD-10-CM

## 2023-12-01 NOTE — TELEPHONE ENCOUNTER
Pt was last seen on 9-19-23 and okayed for a 3 month f/u.  Pt is due for 3rd refill. Routing to Dr. Santos for approval. Spoke with pt regarding refill request's status.  DEBO

## 2023-12-01 NOTE — TELEPHONE ENCOUNTER
Caller: Abdi Krishna    Relationship: Self    Best call back number: [phone]     Requested Prescriptions:   Requested Prescriptions     Pending Prescriptions Disp Refills    lisdexamfetamine (Vyvanse) 50 MG capsule 30 capsule 0     Sig: Take 1 capsule by mouth Every Morning        Pharmacy where request should be sent: [address] - [phone] PH - [phone] FX     Last office visit with prescribing clinician: 9/19/2023   Last telemedicine visit with prescribing clinician: Visit date not found   Next office visit with prescribing clinician: 12/19/2023     Additional details provided by patient: PATIENT IS REQUESTING 60MG OF VYVANSE    Does the patient have less than a 3 day supply:  [x] Yes  [] No    Would you like a call back once the refill request has been completed: [] Yes [x] No    If the office needs to give you a call back, can they leave a voicemail: [x] Yes [] No    Trae Novoa Rep   12/01/23 13:54 CST

## 2023-12-04 RX ORDER — LISDEXAMFETAMINE DIMESYLATE CAPSULES 50 MG/1
50 CAPSULE ORAL EVERY MORNING
Qty: 30 CAPSULE | Refills: 0 | Status: SHIPPED | OUTPATIENT
Start: 2023-12-04

## 2024-01-10 ENCOUNTER — LAB (OUTPATIENT)
Dept: LAB | Facility: HOSPITAL | Age: 34
End: 2024-01-10
Payer: COMMERCIAL

## 2024-01-10 ENCOUNTER — OFFICE VISIT (OUTPATIENT)
Dept: FAMILY MEDICINE CLINIC | Facility: CLINIC | Age: 34
End: 2024-01-10
Payer: COMMERCIAL

## 2024-01-10 VITALS
HEART RATE: 76 BPM | OXYGEN SATURATION: 98 % | HEIGHT: 70 IN | BODY MASS INDEX: 23.05 KG/M2 | WEIGHT: 161 LBS | SYSTOLIC BLOOD PRESSURE: 123 MMHG | DIASTOLIC BLOOD PRESSURE: 79 MMHG

## 2024-01-10 DIAGNOSIS — F90.0 ATTENTION DEFICIT HYPERACTIVITY DISORDER (ADHD), PREDOMINANTLY INATTENTIVE TYPE: ICD-10-CM

## 2024-01-10 DIAGNOSIS — F90.0 ATTENTION DEFICIT HYPERACTIVITY DISORDER (ADHD), PREDOMINANTLY INATTENTIVE TYPE: Primary | ICD-10-CM

## 2024-01-10 LAB
AMPHET+METHAMPHET UR QL: NEGATIVE
AMPHETAMINES UR QL: NEGATIVE
BARBITURATES UR QL SCN: NEGATIVE
BENZODIAZ UR QL SCN: NEGATIVE
BUPRENORPHINE SERPL-MCNC: NEGATIVE NG/ML
CANNABINOIDS SERPL QL: NEGATIVE
COCAINE UR QL: NEGATIVE
FENTANYL UR-MCNC: NEGATIVE NG/ML
METHADONE UR QL SCN: NEGATIVE
OPIATES UR QL: NEGATIVE
OXYCODONE UR QL SCN: NEGATIVE
PCP UR QL SCN: NEGATIVE
TRICYCLICS UR QL SCN: NEGATIVE

## 2024-01-10 PROCEDURE — 80307 DRUG TEST PRSMV CHEM ANLYZR: CPT

## 2024-01-10 PROCEDURE — 99213 OFFICE O/P EST LOW 20 MIN: CPT | Performed by: PEDIATRICS

## 2024-01-10 RX ORDER — LISDEXAMFETAMINE DIMESYLATE CAPSULES 60 MG/1
60 CAPSULE ORAL EVERY MORNING
Qty: 30 CAPSULE | Refills: 0 | Status: SHIPPED | OUTPATIENT
Start: 2024-01-10

## 2024-01-10 NOTE — ASSESSMENT & PLAN NOTE
Psychological condition is worsening.  Medication changes per orders.  Will go to 60 mg  Psychological condition  will be reassessed in 3 months.    ADHD Follow up:    PDMP reviewed and is clean. ADRS (Adult ADHD Assessment Form) reviewed in detail, scanned in chart, and has been reviewed at time of appointment.  All questions, including medication and side effects, were discussed in detail at time of patient's visit. Patient will change medication dose which was discussed at today's visit. Patient is to return in 3 month(s).    Last Urine Toxicity  More data exists         Latest Ref Rng & Units 11/1/2022 1/31/2022   LAST URINE TOXICITY RESULTS   Amphetamine, Urine Qual Negative Positive  Positive    Barbiturates Screen, Urine Negative Negative  Negative    Benzodiazepine Screen, Urine Negative Negative  Negative    Buprenorphine, Screen, Urine Negative Negative  Negative    Cocaine Screen, Urine Negative Negative  Negative    Methadone Screen , Urine Negative Negative  Negative    Methamphetamine, Ur Negative Negative  Negative         Urine Drug Screen Results: UDS RESULTS: Updated results needed

## 2024-01-10 NOTE — PROGRESS NOTES
"Chief Complaint  ADHD    Subjective    History of Present Illness      Patient presents to [facility] for   History of Present Illness  ADHD/Mood HPI    Visit for:  follow-up. Most recent visit was 4 months ago.  Interim changes to follow up on today: medication dose change - back to 60mg  Work/School Performance:  going well  Cognitive:  able to focus    Behavior  Hyperactivity: is not hyperactive  Impulsivity: no impulsivity  Tasking: able to initiate tasks and able to complete tasks    Social  ADHD social/impulsive symptoms:  not impatient and no excessive talking    Behavioral health  Behavior: no concerns  Emotional coping: demonstrates feelings of no concerns    He would like to go back to Vyvanse 60mg if availability has returned. 50mg is wearing off too soon.            Review of Systems    I have reviewed and agree with the HPI information as above.  Margarito Santos MD     Objective   Vital Signs:   /79   Pulse 76   Ht 177.8 cm (70\")   Wt 73 kg (161 lb)   SpO2 98%   BMI 23.10 kg/m²     BMI is within normal parameters. No other follow-up for BMI required.      Physical Exam  Constitutional:       Appearance: Normal appearance. He is normal weight.   Cardiovascular:      Rate and Rhythm: Normal rate and regular rhythm.      Heart sounds: Normal heart sounds.   Pulmonary:      Effort: Pulmonary effort is normal.      Breath sounds: Normal breath sounds.   Neurological:      Mental Status: He is alert.   Psychiatric:         Mood and Affect: Mood normal.         Behavior: Behavior normal.               Result Review  Data Reviewed:        Fentanyl, Urine - Urine, Clean Catch (01/10/2024 09:54)  Urine Drug Screen - Urine, Clean Catch (01/10/2024 09:54)           Assessment and Plan      Diagnoses and all orders for this visit:    1. Attention deficit hyperactivity disorder (ADHD), predominantly inattentive type (Primary)  Assessment & Plan:  Psychological condition is " worsening.  Medication changes per orders.  Will go to 60 mg  Psychological condition  will be reassessed in 3 months.    ADHD Follow up:    PDMP reviewed and is clean. ADRS (Adult ADHD Assessment Form) reviewed in detail, scanned in chart, and has been reviewed at time of appointment.  All questions, including medication and side effects, were discussed in detail at time of patient's visit. Patient will change medication dose which was discussed at today's visit. Patient is to return in 3 month(s).    Last Urine Toxicity  More data exists         Latest Ref Rng & Units 11/1/2022 1/31/2022   LAST URINE TOXICITY RESULTS   Amphetamine, Urine Qual Negative Positive  Positive    Barbiturates Screen, Urine Negative Negative  Negative    Benzodiazepine Screen, Urine Negative Negative  Negative    Buprenorphine, Screen, Urine Negative Negative  Negative    Cocaine Screen, Urine Negative Negative  Negative    Methadone Screen , Urine Negative Negative  Negative    Methamphetamine, Ur Negative Negative  Negative         Urine Drug Screen Results: UDS RESULTS: Updated results needed     Orders:  -     Urine Drug Screen - Urine, Clean Catch; Future  -     lisdexamfetamine (Vyvanse) 60 MG capsule; Take 1 capsule by mouth Every Morning  Dispense: 30 capsule; Refill: 0            Follow Up   Return in about 3 months (around 4/10/2024) for Recheck.  Patient was given instructions and counseling regarding his condition or for health maintenance advice. Please see specific information pulled into the AVS if appropriate.

## 2024-02-08 DIAGNOSIS — F90.0 ATTENTION DEFICIT HYPERACTIVITY DISORDER (ADHD), PREDOMINANTLY INATTENTIVE TYPE: ICD-10-CM

## 2024-02-10 ENCOUNTER — NURSE TRIAGE (OUTPATIENT)
Dept: CALL CENTER | Facility: HOSPITAL | Age: 34
End: 2024-02-10
Payer: COMMERCIAL

## 2024-02-10 DIAGNOSIS — F90.0 ATTENTION DEFICIT HYPERACTIVITY DISORDER (ADHD), PREDOMINANTLY INATTENTIVE TYPE: ICD-10-CM

## 2024-02-10 NOTE — TELEPHONE ENCOUNTER
Asking if med refill request is showing in EPIC.  Epic records reviewed with caller and prescription is pending approval, should be taken care of on Monday.

## 2024-02-10 NOTE — TELEPHONE ENCOUNTER
"Reason for Disposition   Caller requesting a CONTROLLED substance prescription refill (e.g., narcotics, ADHD medicines)    Additional Information   Negative: New-onset or worsening symptoms, see that guideline (e.g., diarrhea, runny nose, sore throat)   Negative: Medicine question not related to refill or renewal   Negative: Caller (e.g., patient or pharmacist) requesting information about a new medicine   Negative: Caller requesting information unrelated to medicine   Negative: [1] Prescription refill request for ESSENTIAL medicine (i.e., likelihood of harm to patient if not taken) AND [2] triager unable to refill per department policy   Negative: [1] Prescription not at pharmacy AND [2] was prescribed by PCP recently  (Exception: Triager has access to EMR and prescription is recorded there. Go to Home Care and confirm for pharmacy.)   Negative: [1] Pharmacy calling with prescription questions AND [2] triager unable to answer question   Negative: Prescription request for new medicine (not a refill)    Answer Assessment - Initial Assessment Questions  1. DRUG NAME: \"What medicine do you need to have refilled?\"      Vyvanse  2. REFILLS REMAINING: \"How many refills are remaining?\" (Note: The label on the medicine or pill bottle will show how many refills are remaining. If there are no refills remaining, then a renewal may be needed.)      no  3. EXPIRATION DATE: \"What is the expiration date?\" (Note: The label states when the prescription will , and thus can no longer be refilled.)      na  4. PRESCRIBING HCP: \"Who prescribed it?\" Reason: If prescribed by specialist, call should be referred to that group.      Dr. Santos  5. SYMPTOMS: \"Do you have any symptoms?\"      no  6. PREGNANCY: \"Is there any chance that you are pregnant?\" \"When was your last menstrual period?\"      na    Protocols used: Medication Refill and Renewal Call-ADULT-    "

## 2024-02-12 RX ORDER — LISDEXAMFETAMINE DIMESYLATE 60 MG/1
60 CAPSULE ORAL EVERY MORNING
Qty: 30 CAPSULE | Refills: 0 | OUTPATIENT
Start: 2024-02-12

## 2024-02-12 RX ORDER — LISDEXAMFETAMINE DIMESYLATE CAPSULES 60 MG/1
60 CAPSULE ORAL EVERY MORNING
Qty: 30 CAPSULE | Refills: 0 | Status: SHIPPED | OUTPATIENT
Start: 2024-02-12

## 2024-03-12 DIAGNOSIS — F90.0 ATTENTION DEFICIT HYPERACTIVITY DISORDER (ADHD), PREDOMINANTLY INATTENTIVE TYPE: ICD-10-CM

## 2024-03-12 NOTE — TELEPHONE ENCOUNTER
Pt was last seen on 1-10-24 and okayed for a 3 month f/u.  F/u appt scheduled for 4-10-24. Pt is due for his 3rd refill.  Routing to Dr. Santos for approval.  Sent a [employer] message to pt regarding refill request.

## 2024-03-12 NOTE — TELEPHONE ENCOUNTER
Caller: Abdi Krishna MARCY    Relationship: Self    Best call back number: [phone]     Requested Prescriptions:   Requested Prescriptions     Pending Prescriptions Disp Refills    lisdexamfetamine (Vyvanse) 60 MG capsule 30 capsule 0     Sig: Take 1 capsule by mouth Every Morning        Pharmacy where request should be sent: [address] - [phone] PH - [phone] FX     Last office visit with prescribing clinician: 1/10/2024   Last telemedicine visit with prescribing clinician: Visit date not found   Next office visit with prescribing clinician: 4/10/2024     Additional details provided by patient: TOTALLY OUT    Does the patient have less than a 3 day supply:  [x] Yes  [] No    Would you like a call back once the refill request has been completed: [] Yes [x] No    If the office needs to give you a call back, can they leave a voicemail: [] Yes [x] No    Trae Wren Rep   03/12/24 10:32 CDT

## 2024-03-13 RX ORDER — LISDEXAMFETAMINE DIMESYLATE CAPSULES 60 MG/1
60 CAPSULE ORAL EVERY MORNING
Qty: 30 CAPSULE | Refills: 0 | Status: SHIPPED | OUTPATIENT
Start: 2024-03-13

## 2024-04-10 ENCOUNTER — OFFICE VISIT (OUTPATIENT)
Dept: FAMILY MEDICINE CLINIC | Facility: CLINIC | Age: 34
End: 2024-04-10
Payer: COMMERCIAL

## 2024-04-10 VITALS
HEART RATE: 92 BPM | SYSTOLIC BLOOD PRESSURE: 107 MMHG | WEIGHT: 163 LBS | DIASTOLIC BLOOD PRESSURE: 67 MMHG | HEIGHT: 70 IN | BODY MASS INDEX: 23.34 KG/M2 | OXYGEN SATURATION: 97 %

## 2024-04-10 DIAGNOSIS — F90.0 ATTENTION DEFICIT HYPERACTIVITY DISORDER (ADHD), PREDOMINANTLY INATTENTIVE TYPE: Primary | ICD-10-CM

## 2024-04-10 PROCEDURE — 99213 OFFICE O/P EST LOW 20 MIN: CPT | Performed by: PEDIATRICS

## 2024-04-10 RX ORDER — LISDEXAMFETAMINE DIMESYLATE CAPSULES 60 MG/1
60 CAPSULE ORAL EVERY MORNING
Qty: 30 CAPSULE | Refills: 0 | Status: SHIPPED | OUTPATIENT
Start: 2024-04-10

## 2024-04-10 NOTE — PROGRESS NOTES
"Chief Complaint  ADHD    Subjective    History of Present Illness      Patient presents to [facility] for   History of Present Illness  ADHD/Mood HPI    Visit for:  follow-up. Most recent visit was 3 months ago.  Interim changes to follow up on today: no change in medication  Work/School Performance:  going well  Cognitive:  able to focus    Behavior  Hyperactivity: is not hyperactive  Impulsivity: no impulsivity and no unsafe behavior  Tasking: able to initiate tasks and able to complete tasks    Social  ADHD social/impulsive symptoms:  not impatient and no excessive talking    Behavioral health  Behavior: no concerns  Emotional coping: demonstrates feelings of no concerns           Review of Systems    I have reviewed and agree with the HPI information as above.  Margarito Santos MD     Objective   Vital Signs:   /67   Pulse 92   Ht 177.8 cm (70\")   Wt 73.9 kg (163 lb)   SpO2 97%   BMI 23.39 kg/m²     BMI is within normal parameters. No other follow-up for BMI required.      Physical Exam  Constitutional:       Appearance: Normal appearance. He is normal weight.   Cardiovascular:      Rate and Rhythm: Normal rate and regular rhythm.      Heart sounds: Normal heart sounds.   Pulmonary:      Effort: Pulmonary effort is normal.      Breath sounds: Normal breath sounds.   Neurological:      Mental Status: He is alert.   Psychiatric:         Mood and Affect: Mood normal.         Behavior: Behavior normal.          BARRY-7:      PHQ-2 Depression Screening  Little interest or pleasure in doing things? 0-->not at all   Feeling down, depressed, or hopeless? 0-->not at all   PHQ-2 Total Score 0     PHQ-9 Depression Screening  Little interest or pleasure in doing things? 0-->not at all   Feeling down, depressed, or hopeless? 0-->not at all   Trouble falling or staying asleep, or sleeping too much?     Feeling tired or having little energy?     Poor appetite or overeating?     Feeling bad " about yourself - or that you are a failure or have let yourself or your family down?     Trouble concentrating on things, such as reading the newspaper or watching television?     Moving or speaking so slowly that other people could have noticed? Or the opposite - being so fidgety or restless that you have been moving around a lot more than usual?     Thoughts that you would be better off dead, or of hurting yourself in some way?     PHQ-9 Total Score 0   If you checked off any problems, how difficult have these problems made it for you to do your work, take care of things at home, or get along with other people?        Result Review  Data Reviewed:                   Assessment and Plan      Diagnoses and all orders for this visit:    1. Attention deficit hyperactivity disorder (ADHD), predominantly inattentive type (Primary)  Assessment & Plan:  Psychological condition is improving with treatment.  Continue current treatment regimen.  Psychological condition  will be reassessed in 3 months.    Orders:  -     lisdexamfetamine (Vyvanse) 60 MG capsule; Take 1 capsule by mouth Every Morning  Dispense: 30 capsule; Refill: 0            Follow Up   No follow-ups on file.  Patient was given instructions and counseling regarding his condition or for health maintenance advice. Please see specific information pulled into the AVS if appropriate.       Answers submitted by the patient for this visit:  Primary Reason for Visit (Submitted on 4/10/2024)  What is the primary reason for your visit?: Other  Other (Submitted on 4/10/2024)  Please describe your symptoms.: None  Have you had these symptoms before?: No  How long have you been having these symptoms?: 1-4 days  Please list any medications you are currently taking for this condition.: Vyvanse

## 2024-05-16 DIAGNOSIS — F90.0 ATTENTION DEFICIT HYPERACTIVITY DISORDER (ADHD), PREDOMINANTLY INATTENTIVE TYPE: ICD-10-CM

## 2024-05-16 RX ORDER — LISDEXAMFETAMINE DIMESYLATE 60 MG/1
60 CAPSULE ORAL EVERY MORNING
Qty: 30 CAPSULE | Refills: 0 | Status: SHIPPED | OUTPATIENT
Start: 2024-05-16

## 2024-05-16 NOTE — TELEPHONE ENCOUNTER
Rx Refill Note  Requested Prescriptions     Pending Prescriptions Disp Refills    lisdexamfetamine (Vyvanse) 60 MG capsule 30 capsule 0     Sig: Take 1 capsule by mouth Every Morning      Last office visit with prescribing clinician: 4/10/2024   Last telemedicine visit with prescribing clinician: Visit date not found   Next office visit with prescribing clinician: Appointment with Karen An APRN (07/10/2024)     Refill: 2nd  Date of last script: 4/10/24  ANNMARIE Rubin MA  05/16/24, 15:07 CDT

## 2024-05-16 NOTE — TELEPHONE ENCOUNTER
PATIENT STATED HE WILL BE GOING OUT OF TOWN ON 05- AND WOULD LIKE HIS MEDICATION BEFORE LEAVING, IF POSSIBLE.

## 2024-05-17 RX ORDER — LISDEXAMFETAMINE DIMESYLATE 60 MG/1
60 CAPSULE ORAL EVERY MORNING
Qty: 30 CAPSULE | Refills: 0 | OUTPATIENT
Start: 2024-05-17

## 2024-06-17 DIAGNOSIS — F90.0 ATTENTION DEFICIT HYPERACTIVITY DISORDER (ADHD), PREDOMINANTLY INATTENTIVE TYPE: ICD-10-CM

## 2024-06-17 RX ORDER — LISDEXAMFETAMINE DIMESYLATE 60 MG/1
60 CAPSULE ORAL EVERY MORNING
Qty: 30 CAPSULE | Refills: 0 | Status: SHIPPED | OUTPATIENT
Start: 2024-06-17 | End: 2024-07-17

## 2024-06-17 NOTE — TELEPHONE ENCOUNTER
Pt last seen 4/10/24. Pt was ok'd for 3 months.  Refill: 3rd  Date of last script: 5/16/24  UDS 1/10/24  F/u apt: 7/10/24  Routing to Dr. Santos to review

## 2024-06-17 NOTE — TELEPHONE ENCOUNTER
Caller: Abdi Krishna    Relationship: Self    Best call back number: [phone]    Requested Prescriptions     Pending Prescriptions Disp Refills    lisdexamfetamine (Vyvanse) 60 MG capsule 30 capsule 0     Sig: Take 1 capsule by mouth Every Morning        Pharmacy where request should be sent: [facility] [address] [address]  [address] - [phone] PH - [phone] FX     Last office visit with prescribing clinician: 2/13/2023   Last telemedicine visit with prescribing clinician: Visit date not found   Next office visit with prescribing clinician: 7/10/2024     Additional details provided by patient:     ONE PILL LEFT    Does the patient have less than a 3 day supply:  [x] Yes  [] No    Would you like a call back once the refill request has been completed: [] Yes [] No    If the office needs to give you a call back, can they leave a voicemail: [] Yes [] No    Trae Aguirre Rep   06/17/24 12:48 CDT

## 2024-07-29 ENCOUNTER — OFFICE VISIT (OUTPATIENT)
Dept: FAMILY MEDICINE CLINIC | Facility: CLINIC | Age: 34
End: 2024-07-29
Payer: COMMERCIAL

## 2024-07-29 VITALS
HEIGHT: 70 IN | SYSTOLIC BLOOD PRESSURE: 111 MMHG | TEMPERATURE: 98 F | HEART RATE: 80 BPM | DIASTOLIC BLOOD PRESSURE: 77 MMHG | WEIGHT: 171 LBS | RESPIRATION RATE: 20 BRPM | BODY MASS INDEX: 24.48 KG/M2

## 2024-07-29 DIAGNOSIS — F90.0 ATTENTION DEFICIT HYPERACTIVITY DISORDER (ADHD), PREDOMINANTLY INATTENTIVE TYPE: Primary | ICD-10-CM

## 2024-07-29 PROCEDURE — 99213 OFFICE O/P EST LOW 20 MIN: CPT

## 2024-07-29 RX ORDER — LISDEXAMFETAMINE DIMESYLATE 60 MG/1
60 CAPSULE ORAL EVERY MORNING
Qty: 30 CAPSULE | Refills: 0 | Status: SHIPPED | OUTPATIENT
Start: 2024-07-29 | End: 2024-08-28

## 2024-07-29 RX ORDER — LISDEXAMFETAMINE DIMESYLATE 60 MG/1
60 CAPSULE ORAL EVERY MORNING
Qty: 30 CAPSULE | Refills: 0 | Status: SHIPPED | OUTPATIENT
Start: 2024-08-26 | End: 2024-09-25

## 2024-07-29 NOTE — PROGRESS NOTES
"Chief Complaint  ADD and Med Refill    Subjective    History of Present Illness      Patient presents to [facility] for   History of Present Illness  No concerns. States he is doing well.        Review of Systems   All other systems reviewed and are negative.      I have reviewed and agree with the HPI and ROS information as above.  Karen An, ESTEBAN     Objective   Vital Signs:   /77   Pulse 80   Temp 98 °F (36.7 °C)   Resp 20   Ht 177.8 cm (70\")   Wt 77.6 kg (171 lb)   BMI 24.54 kg/m²     BMI is within normal parameters. No other follow-up for BMI required.      Physical Exam  Constitutional:       Appearance: Normal appearance. He is well-developed.   HENT:      Head: Normocephalic and atraumatic.      Right Ear: Tympanic membrane, ear canal and external ear normal.      Left Ear: Tympanic membrane, ear canal and external ear normal.      Nose: Nose normal. No septal deviation, nasal tenderness or congestion.      Mouth/Throat:      Lips: Pink. No lesions.      Mouth: Mucous membranes are moist. No oral lesions.      Dentition: Normal dentition.      Pharynx: Oropharynx is clear. No pharyngeal swelling, oropharyngeal exudate or posterior oropharyngeal erythema.   Eyes:      General: Lids are normal. Vision grossly intact. No scleral icterus.        Right eye: No discharge.         Left eye: No discharge.      Extraocular Movements: Extraocular movements intact.      Conjunctiva/sclera: Conjunctivae normal.      Right eye: Right conjunctiva is not injected.      Left eye: Left conjunctiva is not injected.      Pupils: Pupils are equal, round, and reactive to light.   Neck:      Thyroid: No thyroid mass.      Trachea: Trachea normal.   Cardiovascular:      Rate and Rhythm: Normal rate and regular rhythm.      Heart sounds: Normal heart sounds. No murmur heard.     No gallop.   Pulmonary:      Effort: Pulmonary effort is normal.      Breath sounds: Normal breath sounds " and air entry. No wheezing, rhonchi or rales.   Abdominal:      General: There is no distension.      Palpations: Abdomen is soft. There is no mass.      Tenderness: There is no abdominal tenderness. There is no right CVA tenderness, left CVA tenderness, guarding or rebound.   Musculoskeletal:         General: No tenderness or deformity. Normal range of motion.      Cervical back: Full passive range of motion without pain, normal range of motion and neck supple.      Thoracic back: Normal.      Right lower leg: No edema.      Left lower leg: No edema.   Skin:     General: Skin is warm and dry.      Coloration: Skin is not jaundiced.      Findings: No rash.   Neurological:      Mental Status: He is alert and oriented to person, place, and time.      Sensory: Sensation is intact.      Motor: Motor function is intact.      Coordination: Coordination is intact.      Gait: Gait is intact.      Deep Tendon Reflexes: Reflexes are normal and symmetric.   Psychiatric:         Mood and Affect: Mood and affect normal.         Judgment: Judgment normal.          BARRY-7:      PHQ-2 Depression Screening  Little interest or pleasure in doing things?     Feeling down, depressed, or hopeless?     PHQ-2 Total Score       PHQ-9 Depression Screening  Little interest or pleasure in doing things?     Feeling down, depressed, or hopeless?     Trouble falling or staying asleep, or sleeping too much?     Feeling tired or having little energy?     Poor appetite or overeating?     Feeling bad about yourself - or that you are a failure or have let yourself or your family down?     Trouble concentrating on things, such as reading the newspaper or watching television?     Moving or speaking so slowly that other people could have noticed? Or the opposite - being so fidgety or restless that you have been moving around a lot more than usual?     Thoughts that you would be better off dead, or of hurting yourself in some way?     PHQ-9 Total Score      If you checked off any problems, how difficult have these problems made it for you to do your work, take care of things at home, or get along with other people?        Result Review  Data Reviewed:                   Assessment and Plan      Diagnoses and all orders for this visit:    1. Attention deficit hyperactivity disorder (ADHD), predominantly inattentive type (Primary)    Patient is doing well on current medication regimen and wishes to continue same.     Plan  ADHD stable and well controlled with Vyvanse 60mg. UDS is regan dean pending         Follow Up   Return in about 3 months (around 10/29/2024) for ADHD.  Patient was given instructions and counseling regarding his condition or for health maintenance advice. Please see specific information pulled into the AVS if appropriate.

## 2024-10-03 DIAGNOSIS — F90.0 ATTENTION DEFICIT HYPERACTIVITY DISORDER (ADHD), PREDOMINANTLY INATTENTIVE TYPE: ICD-10-CM

## 2024-10-03 RX ORDER — LISDEXAMFETAMINE DIMESYLATE 60 MG/1
60 CAPSULE ORAL EVERY MORNING
Qty: 30 CAPSULE | Refills: 0 | Status: SHIPPED | OUTPATIENT
Start: 2024-10-03 | End: 2024-11-02

## 2024-10-03 NOTE — TELEPHONE ENCOUNTER
Rx Refill Note  Requested Prescriptions     Pending Prescriptions Disp Refills    lisdexamfetamine (Vyvanse) 60 MG capsule 30 capsule 0     Sig: Take 1 capsule by mouth Every Morning for 30 days      Last office visit with prescribing clinician: Office Visit with Karen An APRN (07/29/2024)    Last telemedicine visit with prescribing clinician: Visit date not found   Next office visit with prescribing clinician: Appointment with Karen An APRN (10/14/2024)     3rd   Last refill 8/26/24    Tim Rubin MA  10/03/24, 08:46 CDT

## 2024-10-03 NOTE — TELEPHONE ENCOUNTER
Caller: Abdi Krishna    Relationship: Self    Best call back number: [phone]     Requested Prescriptions:   Requested Prescriptions     Pending Prescriptions Disp Refills    lisdexamfetamine (Vyvanse) 60 MG capsule 30 capsule 0     Sig: Take 1 capsule by mouth Every Morning for 30 days        Pharmacy where request should be sent: [address] - [phone]  - [phone] FX     Last office visit with prescribing clinician: 4/10/2024   Last telemedicine visit with prescribing clinician: Visit date not found   Next office visit with prescribing clinician: Visit date not found     Additional details provided by patient: PATIENT TOOK LAST DOSE YESTERDAY    Does the patient have less than a 3 day supply:  [x] Yes  [] No    Would you like a call back once the refill request has been completed: [x] Yes [] No    If the office needs to give you a call back, can they leave a voicemail: [x] Yes [] No    Trae Novoa Rep   10/03/24 08:20 CDT

## 2024-11-13 ENCOUNTER — OFFICE VISIT (OUTPATIENT)
Dept: FAMILY MEDICINE CLINIC | Facility: CLINIC | Age: 34
End: 2024-11-13
Payer: COMMERCIAL

## 2024-11-13 VITALS
DIASTOLIC BLOOD PRESSURE: 74 MMHG | BODY MASS INDEX: 23.62 KG/M2 | HEART RATE: 78 BPM | WEIGHT: 165 LBS | SYSTOLIC BLOOD PRESSURE: 110 MMHG | RESPIRATION RATE: 18 BRPM | HEIGHT: 70 IN

## 2024-11-13 DIAGNOSIS — F90.0 ATTENTION DEFICIT HYPERACTIVITY DISORDER (ADHD), PREDOMINANTLY INATTENTIVE TYPE: Primary | ICD-10-CM

## 2024-11-13 PROCEDURE — 99213 OFFICE O/P EST LOW 20 MIN: CPT

## 2024-11-13 RX ORDER — LISDEXAMFETAMINE DIMESYLATE 60 MG/1
60 CAPSULE ORAL EVERY MORNING
Qty: 30 CAPSULE | Refills: 0 | Status: SHIPPED | OUTPATIENT
Start: 2024-11-13 | End: 2024-12-13

## 2024-11-13 RX ORDER — LISDEXAMFETAMINE DIMESYLATE 60 MG/1
60 CAPSULE ORAL EVERY MORNING
Qty: 30 CAPSULE | Refills: 0 | Status: SHIPPED | OUTPATIENT
Start: 2024-12-11 | End: 2025-01-10

## 2024-11-13 NOTE — PROGRESS NOTES
"Chief Complaint  ADHD    Subjective    History of Present Illness      Patient presents to [facility] for   History of Present Illness  Pt last seen 7/29/24. Pt here for 3 month f/u. No complaints or concerns voiced at this time.       Review of Systems   All other systems reviewed and are negative.      I have reviewed and agree with the HPI and ROS information as above.  ESTEBAN Fox     Objective   Vital Signs:   /74   Pulse 78   Resp 18   Ht 177.8 cm (70\")   Wt 74.8 kg (165 lb)   BMI 23.68 kg/m²     BMI is within normal parameters. No other follow-up for BMI required.      Physical Exam  Constitutional:       Appearance: Normal appearance. He is well-developed.   HENT:      Head: Normocephalic and atraumatic.      Right Ear: External ear normal.      Left Ear: External ear normal.      Nose: Nose normal. No nasal tenderness or congestion.      Mouth/Throat:      Lips: Pink. No lesions.      Mouth: Mucous membranes are moist. No oral lesions.      Dentition: Normal dentition.      Pharynx: Oropharynx is clear. No pharyngeal swelling, oropharyngeal exudate or posterior oropharyngeal erythema.   Eyes:      General: Lids are normal. Vision grossly intact. No scleral icterus.        Right eye: No discharge.         Left eye: No discharge.      Extraocular Movements: Extraocular movements intact.      Conjunctiva/sclera: Conjunctivae normal.      Right eye: Right conjunctiva is not injected.      Left eye: Left conjunctiva is not injected.      Pupils: Pupils are equal, round, and reactive to light.   Cardiovascular:      Rate and Rhythm: Normal rate and regular rhythm.      Heart sounds: Normal heart sounds. No murmur heard.     No gallop.   Pulmonary:      Effort: Pulmonary effort is normal.      Breath sounds: Normal breath sounds and air entry. No wheezing, rhonchi or rales.   Musculoskeletal:         General: No tenderness or deformity. Normal range of motion.     "  Cervical back: Full passive range of motion without pain, normal range of motion and neck supple.      Right lower leg: No edema.      Left lower leg: No edema.   Skin:     General: Skin is warm and dry.      Coloration: Skin is not jaundiced.      Findings: No rash.   Neurological:      Mental Status: He is alert and oriented to person, place, and time.      Sensory: Sensation is intact.      Motor: Motor function is intact.      Coordination: Coordination is intact.      Gait: Gait is intact.   Psychiatric:         Attention and Perception: Attention normal.         Mood and Affect: Mood and affect normal.         Behavior: Behavior is not hyperactive. Behavior is cooperative.         Thought Content: Thought content normal.         Judgment: Judgment normal.          BARRY-7:      PHQ-2 Depression Screening    Little interest or pleasure in doing things?     Feeling down, depressed, or hopeless?     PHQ-2 Total Score        PHQ-9 Depression Screening  Little interest or pleasure in doing things?     Feeling down, depressed, or hopeless?     PHQ-2 Total Score     Trouble falling or staying asleep, or sleeping too much?     Feeling tired or having little energy?     Poor appetite or overeating?     Feeling bad about yourself - or that you are a failure or have let yourself or your family down?     Trouble concentrating on things, such as reading the newspaper or watching television?     Moving or speaking so slowly that other people could have noticed? Or the opposite - being so fidgety or restless that you have been moving around a lot more than usual?     Thoughts that you would be better off dead, or of hurting yourself in some way?     PHQ-9 Total Score     If you checked off any problems, how difficult have these problems made it for you to do your work, take care of things at home, or get along with other people?             Result Review  Data Reviewed:                   Assessment and Plan      Diagnoses and  all orders for this visit:    1. Attention deficit hyperactivity disorder (ADHD), predominantly inattentive type (Primary)  Comments:  ADHD stable with Vyvanse 60mg. UDS jermaine COPPOLA pending.            Follow Up   Return in about 3 months (around 2/13/2025) for ADHD.  Patient was given instructions and counseling regarding his condition or for health maintenance advice. Please see specific information pulled into the AVS if appropriate.

## 2025-01-16 DIAGNOSIS — F90.0 ATTENTION DEFICIT HYPERACTIVITY DISORDER (ADHD), PREDOMINANTLY INATTENTIVE TYPE: ICD-10-CM

## 2025-01-16 RX ORDER — LISDEXAMFETAMINE DIMESYLATE 60 MG/1
60 CAPSULE ORAL EVERY MORNING
Qty: 30 CAPSULE | Refills: 0 | Status: SHIPPED | OUTPATIENT
Start: 2025-01-16 | End: 2025-02-15

## 2025-01-16 NOTE — TELEPHONE ENCOUNTER
Caller: Ojo SarcoAbdi blum    Relationship: Self    Best call back number: [phone]     Requested Prescriptions:   Requested Prescriptions     Pending Prescriptions Disp Refills    lisdexamfetamine (Vyvanse) 60 MG capsule 30 capsule 0     Sig: Take 1 capsule by mouth Every Morning for 30 days        Pharmacy where request should be sent: [address] - [phone]  - [phone] [facility]     Last office visit with prescribing clinician: 4/10/2024   Last telemedicine visit with prescribing clinician: Visit date not found   Next office visit with prescribing clinician: Visit date not found     Additional details provided by patient: COMPLETELY OUT     Does the patient have less than a 3 day supply:  [x] Yes  [] No    Would you like a call back once the refill request has been completed: [] Yes [x] No    If the office needs to give you a call back, can they leave a voicemail: [] Yes [x] No    Silvestre Castañeda III, [employer] Rep   01/16/25 08:58 CST

## 2025-01-16 NOTE — TELEPHONE ENCOUNTER
Last OV:11/13/24  Plan: 3 mo FU  Future OV:02/10/25  CSA:needs to sign at next visit  UDS: needs to have done at next visit

## 2025-01-22 NOTE — ASSESSMENT & PLAN NOTE
Psychological condition is improving with treatment.  Continue current treatment regimen.  Psychological condition  will be reassessed in 3 months    Will watch may need to go back to 60 if next prescription seems to quit working.   Is This A New Presentation, Or A Follow-Up?: Growth

## 2025-02-10 ENCOUNTER — OFFICE VISIT (OUTPATIENT)
Dept: FAMILY MEDICINE CLINIC | Facility: CLINIC | Age: 35
End: 2025-02-10
Payer: COMMERCIAL

## 2025-02-10 ENCOUNTER — LAB (OUTPATIENT)
Dept: LAB | Facility: HOSPITAL | Age: 35
End: 2025-02-10
Payer: COMMERCIAL

## 2025-02-10 VITALS
OXYGEN SATURATION: 99 % | HEIGHT: 70 IN | WEIGHT: 169 LBS | SYSTOLIC BLOOD PRESSURE: 123 MMHG | HEART RATE: 85 BPM | DIASTOLIC BLOOD PRESSURE: 77 MMHG | BODY MASS INDEX: 24.2 KG/M2

## 2025-02-10 DIAGNOSIS — Z11.59 NEED FOR HEPATITIS C SCREENING TEST: ICD-10-CM

## 2025-02-10 DIAGNOSIS — F90.0 ATTENTION DEFICIT HYPERACTIVITY DISORDER (ADHD), PREDOMINANTLY INATTENTIVE TYPE: ICD-10-CM

## 2025-02-10 DIAGNOSIS — F90.0 ATTENTION DEFICIT HYPERACTIVITY DISORDER (ADHD), PREDOMINANTLY INATTENTIVE TYPE: Primary | ICD-10-CM

## 2025-02-10 DIAGNOSIS — Z00.00 ANNUAL PHYSICAL EXAM: Primary | ICD-10-CM

## 2025-02-10 LAB
ALBUMIN SERPL-MCNC: 4.8 G/DL (ref 3.5–5)
ALBUMIN/GLOB SERPL: 1.5 G/DL (ref 1.1–2.5)
ALP SERPL-CCNC: 54 U/L (ref 24–120)
ALT SERPL W P-5'-P-CCNC: 32 U/L (ref 0–50)
AMPHET+METHAMPHET UR QL: POSITIVE
AMPHETAMINES UR QL: NEGATIVE
ANION GAP SERPL CALCULATED.3IONS-SCNC: 10 MMOL/L (ref 4–13)
AST SERPL-CCNC: 32 U/L (ref 7–45)
AUTO MIXED CELLS #: 0.6 10*3/MM3 (ref 0.1–2.6)
AUTO MIXED CELLS %: 12.9 % (ref 0.1–24)
BARBITURATES UR QL SCN: NEGATIVE
BENZODIAZ UR QL SCN: NEGATIVE
BILIRUB SERPL-MCNC: 0.3 MG/DL (ref 0.1–1)
BILIRUB UR QL STRIP: NEGATIVE
BUN SERPL-MCNC: 14 MG/DL (ref 5–21)
BUN/CREAT SERPL: 15.6
BUPRENORPHINE SERPL-MCNC: NEGATIVE NG/ML
CALCIUM SPEC-SCNC: 9.2 MG/DL (ref 8.6–10.5)
CANNABINOIDS SERPL QL: NEGATIVE
CHLORIDE SERPL-SCNC: 100 MMOL/L (ref 98–110)
CHOLEST SERPL-MCNC: 134 MG/DL (ref 130–200)
CLARITY UR: CLEAR
CO2 SERPL-SCNC: 29 MMOL/L (ref 24–31)
COCAINE UR QL: NEGATIVE
COLOR UR: YELLOW
CREAT SERPL-MCNC: 0.9 MG/DL (ref 0.5–1.4)
EGFRCR SERPLBLD CKD-EPI 2021: 115.7 ML/MIN/1.73
ERYTHROCYTE [DISTWIDTH] IN BLOOD BY AUTOMATED COUNT: 12.5 % (ref 12.3–15.4)
FENTANYL UR-MCNC: NEGATIVE NG/ML
GLOBULIN UR ELPH-MCNC: 3.1 GM/DL
GLUCOSE SERPL-MCNC: 83 MG/DL (ref 65–99)
GLUCOSE UR STRIP-MCNC: NEGATIVE MG/DL
HBA1C MFR BLD: 5.1 % (ref 4.8–5.9)
HCT VFR BLD AUTO: 45.8 % (ref 37.5–51)
HCV AB SER QL: NORMAL
HDLC SERPL-MCNC: 64 MG/DL
HGB BLD-MCNC: 15.2 G/DL (ref 13–17.7)
HGB UR QL STRIP.AUTO: NEGATIVE
KETONES UR QL STRIP: NEGATIVE
LDLC SERPL CALC-MCNC: 58 MG/DL (ref 0–99)
LDLC/HDLC SERPL: 0.92 {RATIO}
LEUKOCYTE ESTERASE UR QL STRIP.AUTO: NEGATIVE
LYMPHOCYTES # BLD AUTO: 2.3 10*3/MM3 (ref 0.7–3.1)
LYMPHOCYTES NFR BLD AUTO: 46.2 % (ref 19.6–45.3)
MCH RBC QN AUTO: 30.8 PG (ref 26.6–33)
MCHC RBC AUTO-ENTMCNC: 33.2 G/DL (ref 31.5–35.7)
MCV RBC AUTO: 92.9 FL (ref 79–97)
METHADONE UR QL SCN: NEGATIVE
NEUTROPHILS NFR BLD AUTO: 2.1 10*3/MM3 (ref 1.7–7)
NEUTROPHILS NFR BLD AUTO: 40.9 % (ref 42.7–76)
NITRITE UR QL STRIP: NEGATIVE
OPIATES UR QL: NEGATIVE
OXYCODONE UR QL SCN: NEGATIVE
PCP UR QL SCN: NEGATIVE
PH UR STRIP.AUTO: 6 [PH] (ref 5–8)
PLATELET # BLD AUTO: 168 10*3/MM3 (ref 140–450)
PMV BLD AUTO: 8.7 FL (ref 6–12)
POTASSIUM SERPL-SCNC: 4.1 MMOL/L (ref 3.5–5.3)
PROT SERPL-MCNC: 7.9 G/DL (ref 6.3–8.7)
PROT UR QL STRIP: NEGATIVE
RBC # BLD AUTO: 4.93 10*6/MM3 (ref 4.14–5.8)
SODIUM SERPL-SCNC: 139 MMOL/L (ref 135–145)
SP GR UR STRIP: 1.02 (ref 1–1.03)
TRICYCLICS UR QL SCN: NEGATIVE
TRIGL SERPL-MCNC: 56 MG/DL (ref 0–149)
TSH SERPL DL<=0.05 MIU/L-ACNC: 2.73 UIU/ML (ref 0.27–4.2)
UROBILINOGEN UR QL STRIP: NORMAL
VLDLC SERPL-MCNC: 12 MG/DL (ref 5–40)
WBC NRBC COR # BLD AUTO: 5 10*3/MM3 (ref 3.4–10.8)

## 2025-02-10 PROCEDURE — 99395 PREV VISIT EST AGE 18-39: CPT

## 2025-02-10 PROCEDURE — 99213 OFFICE O/P EST LOW 20 MIN: CPT

## 2025-02-10 PROCEDURE — 36415 COLL VENOUS BLD VENIPUNCTURE: CPT

## 2025-02-10 PROCEDURE — 83036 HEMOGLOBIN GLYCOSYLATED A1C: CPT

## 2025-02-10 PROCEDURE — 80307 DRUG TEST PRSMV CHEM ANLYZR: CPT

## 2025-02-10 PROCEDURE — 81003 URINALYSIS AUTO W/O SCOPE: CPT

## 2025-02-10 PROCEDURE — 86803 HEPATITIS C AB TEST: CPT

## 2025-02-10 PROCEDURE — 80050 GENERAL HEALTH PANEL: CPT

## 2025-02-10 PROCEDURE — 80061 LIPID PANEL: CPT

## 2025-02-10 RX ORDER — LISDEXAMFETAMINE DIMESYLATE 60 MG/1
60 CAPSULE ORAL EVERY MORNING
Qty: 30 CAPSULE | Refills: 0 | Status: SHIPPED | OUTPATIENT
Start: 2025-03-10 | End: 2025-04-09

## 2025-02-10 RX ORDER — LISDEXAMFETAMINE DIMESYLATE 60 MG/1
60 CAPSULE ORAL EVERY MORNING
Qty: 30 CAPSULE | Refills: 0 | Status: SHIPPED | OUTPATIENT
Start: 2025-02-10 | End: 2025-03-12

## 2025-02-10 NOTE — PROGRESS NOTES
"Chief Complaint  Annual Exam and ADHD    Subjective    History of Present Illness      Patient presents to [facility] for   History of Present Illness  Pt is here today for 3 month ADHD f/u and Annual Exam. He is doing well overall, no complaints or concerns voiced at this time.      Review of Systems    I have reviewed and agree with the HPI and ROS information as above.  Arleen CRENSHAW Filipino, APRN     Objective   Vital Signs:   /77   Pulse 85   Ht 177.8 cm (70\")   Wt 76.7 kg (169 lb)   SpO2 99%   BMI 24.25 kg/m²     BMI is within normal parameters. No other follow-up for BMI required.      Physical Exam  Vitals and nursing note reviewed.   Constitutional:       General: He is not in acute distress.     Appearance: Normal appearance. He is not ill-appearing.   HENT:      Head: Normocephalic and atraumatic.      Right Ear: External ear normal.      Left Ear: External ear normal.      Nose: Nose normal.   Eyes:      Conjunctiva/sclera: Conjunctivae normal.   Cardiovascular:      Rate and Rhythm: Normal rate and regular rhythm.      Pulses: Normal pulses.      Heart sounds: Normal heart sounds.   Pulmonary:      Effort: Pulmonary effort is normal.      Breath sounds: Normal breath sounds.   Skin:     General: Skin is warm and dry.   Neurological:      Mental Status: He is alert and oriented to person, place, and time. Mental status is at baseline.      GCS: GCS eye subscore is 4. GCS verbal subscore is 5. GCS motor subscore is 6.   Psychiatric:         Mood and Affect: Mood normal.         Behavior: Behavior normal.         Thought Content: Thought content normal.         Judgment: Judgment normal.          BARRY-7:      PHQ-2 Depression Screening    Little interest or pleasure in doing things? Not at all   Feeling down, depressed, or hopeless? Not at all   PHQ-2 Total Score 0      PHQ-9 Depression Screening  Little interest or pleasure in doing things? Not at all   Feeling down, " depressed, or hopeless? Not at all   PHQ-2 Total Score 0   Trouble falling or staying asleep, or sleeping too much?     Feeling tired or having little energy?     Poor appetite or overeating?     Feeling bad about yourself - or that you are a failure or have let yourself or your family down?     Trouble concentrating on things, such as reading the newspaper or watching television?     Moving or speaking so slowly that other people could have noticed? Or the opposite - being so fidgety or restless that you have been moving around a lot more than usual?     Thoughts that you would be better off dead, or of hurting yourself in some way?     PHQ-9 Total Score     If you checked off any problems, how difficult have these problems made it for you to do your work, take care of things at home, or get along with other people? Not difficult at all           Result Review  Data Reviewed:            Office Visit with Karen An APRN (11/13/2024)   Urine Drug Screen - Urine, Clean Catch (01/10/2024 09:54)            Assessment and Plan      Diagnoses and all orders for this visit:    1. Annual physical exam (Primary)  -     CBC & Differential  -     Comprehensive Metabolic Panel  -     Lipid Panel  -     Urinalysis With Culture If Indicated - Urine, Clean Catch  -     Hemoglobin A1c  -     TSH    2. Attention deficit hyperactivity disorder (ADHD), predominantly inattentive type  -     Urine Drug Screen - Urine, Clean Catch    3. Need for hepatitis C screening test  -     Hepatitis C antibody        Assessment & Plan    Patient is seen today for annual physical exam and ADHD follow-up.  Labs are needed, he will complete and we will call with results.  Hep C screening will be added to satisfy care gaps.  BMI and blood pressure are within normal limits.  Recommend healthy diet and exercise.  He denies any concerns or complaints today, physical exam is generally unremarkable with no acute concerning findings.  He is needing a  refill on Vyvanse 60 mg daily for ADHD, feels he is doing well on this dose and would like to continue same as his symptoms are well-controlled.  UDS is needed at today's visit, Yoni pending, CSA updated today.  Will pend medication to Dr. Santos on clean UDS.  We will see him in 3 months for recheck.    Plan:  1.  UDS pending  2.  Labs pending  3.  Healthy diet and exercise  4.  Continue Vyvanse 60 mg daily  5.  Follow-up in 3 months        Follow Up   Return in about 3 months (around 5/10/2025).  Patient was given instructions and counseling regarding his condition or for health maintenance advice. Please see specific information pulled into the AVS if appropriate.

## 2025-02-10 NOTE — TELEPHONE ENCOUNTER
----- Message from Arleen English sent at 2/10/2025  2:50 PM CST -----  CBC, lipid panel, CMP, A1c, and UA all good.  TSH and hep C screening still pending, will call when available.

## 2025-02-11 ENCOUNTER — TELEPHONE (OUTPATIENT)
Dept: FAMILY MEDICINE CLINIC | Facility: CLINIC | Age: 35
End: 2025-02-11
Payer: COMMERCIAL

## 2025-04-17 DIAGNOSIS — F90.0 ATTENTION DEFICIT HYPERACTIVITY DISORDER (ADHD), PREDOMINANTLY INATTENTIVE TYPE: ICD-10-CM

## 2025-04-18 RX ORDER — LISDEXAMFETAMINE DIMESYLATE 60 MG/1
60 CAPSULE ORAL EVERY MORNING
Qty: 30 CAPSULE | Refills: 0 | Status: SHIPPED | OUTPATIENT
Start: 2025-04-18 | End: 2025-05-18

## 2025-04-18 NOTE — TELEPHONE ENCOUNTER
Rx Refill Note  Requested Prescriptions     Pending Prescriptions Disp Refills    lisdexamfetamine (VYVANSE) 60 MG capsule [Pharmacy Med Name: lisdexamfetamine 60 mg capsule] 30 capsule 0     Sig: Take 1 capsule by mouth Every Morning for 30 days    CSA 1/10/24  UDS 2/10/25  Last office visit with prescribing clinician: 4/10/2024   Last telemedicine visit with prescribing clinician: Visit date not found   Next office visit with prescribing clinician: Visit date not found       Office Visit with Alreen Phoenix APRN (02/10/2025)

## 2025-05-05 ENCOUNTER — OFFICE VISIT (OUTPATIENT)
Dept: FAMILY MEDICINE CLINIC | Facility: CLINIC | Age: 35
End: 2025-05-05
Payer: COMMERCIAL

## 2025-05-05 VITALS
DIASTOLIC BLOOD PRESSURE: 74 MMHG | BODY MASS INDEX: 24.34 KG/M2 | OXYGEN SATURATION: 98 % | SYSTOLIC BLOOD PRESSURE: 114 MMHG | TEMPERATURE: 97.8 F | HEART RATE: 82 BPM | WEIGHT: 170 LBS | HEIGHT: 70 IN

## 2025-05-05 DIAGNOSIS — F90.0 ATTENTION DEFICIT HYPERACTIVITY DISORDER (ADHD), PREDOMINANTLY INATTENTIVE TYPE: Primary | ICD-10-CM

## 2025-05-05 PROCEDURE — 99213 OFFICE O/P EST LOW 20 MIN: CPT

## 2025-05-05 RX ORDER — LISDEXAMFETAMINE DIMESYLATE 60 MG/1
60 CAPSULE ORAL EVERY MORNING
Qty: 30 CAPSULE | Refills: 0 | Status: SHIPPED | OUTPATIENT
Start: 2025-06-13 | End: 2025-07-13

## 2025-05-05 RX ORDER — LISDEXAMFETAMINE DIMESYLATE 60 MG/1
60 CAPSULE ORAL EVERY MORNING
Qty: 30 CAPSULE | Refills: 0 | Status: SHIPPED | OUTPATIENT
Start: 2025-05-14 | End: 2025-06-13

## 2025-05-05 NOTE — PROGRESS NOTES
"Chief Complaint  Attention deficit hyperactivity disorder (ADHD), predominan    Subjective    History of Present Illness      Patient presents to [facility] for   History of Present Illness  Patient presents today for follow up on ADHD. Patients states he has no complaints with his medication.       History of Present Illness      Review of Systems   All other systems reviewed and are negative.      I have reviewed and agree with the HPI and ROS information as above.  Karen An, APRN     Objective   Vital Signs:   /74   Pulse 82   Temp 97.8 °F (36.6 °C) (Infrared)   Ht 177.8 cm (70\")   Wt 77.1 kg (170 lb)   SpO2 98%   BMI 24.39 kg/m²     BMI is within normal parameters. No other follow-up for BMI required.      Physical Exam  Constitutional:       Appearance: Normal appearance. He is well-developed.   HENT:      Head: Normocephalic and atraumatic.      Right Ear: Tympanic membrane, ear canal and external ear normal.      Left Ear: Tympanic membrane, ear canal and external ear normal.      Nose: Nose normal. No septal deviation, nasal tenderness or congestion.      Mouth/Throat:      Lips: Pink. No lesions.      Mouth: Mucous membranes are moist. No oral lesions.      Dentition: Normal dentition.      Pharynx: Oropharynx is clear. No pharyngeal swelling, oropharyngeal exudate or posterior oropharyngeal erythema.   Eyes:      General: Lids are normal. Vision grossly intact. No scleral icterus.        Right eye: No discharge.         Left eye: No discharge.      Extraocular Movements: Extraocular movements intact.      Conjunctiva/sclera: Conjunctivae normal.      Right eye: Right conjunctiva is not injected.      Left eye: Left conjunctiva is not injected.      Pupils: Pupils are equal, round, and reactive to light.   Neck:      Thyroid: No thyroid mass.      Trachea: Trachea normal.   Cardiovascular:      Rate and Rhythm: Normal rate and regular rhythm.      Heart " sounds: Normal heart sounds. No murmur heard.     No gallop.   Pulmonary:      Effort: Pulmonary effort is normal.      Breath sounds: Normal breath sounds and air entry. No wheezing, rhonchi or rales.   Abdominal:      General: There is no distension.      Palpations: Abdomen is soft. There is no mass.      Tenderness: There is no abdominal tenderness. There is no right CVA tenderness, left CVA tenderness, guarding or rebound.   Musculoskeletal:         General: No tenderness or deformity. Normal range of motion.      Cervical back: Full passive range of motion without pain, normal range of motion and neck supple.      Thoracic back: Normal.      Right lower leg: No edema.      Left lower leg: No edema.   Skin:     General: Skin is warm and dry.      Coloration: Skin is not jaundiced.      Findings: No rash.   Neurological:      Mental Status: He is alert and oriented to person, place, and time.      Sensory: Sensation is intact.      Motor: Motor function is intact.      Coordination: Coordination is intact.      Gait: Gait is intact.      Deep Tendon Reflexes: Reflexes are normal and symmetric.   Psychiatric:         Mood and Affect: Mood and affect normal.         Judgment: Judgment normal.          BARRY-7:      PHQ-2 Depression Screening    Little interest or pleasure in doing things?     Feeling down, depressed, or hopeless?     PHQ-2 Total Score        PHQ-9 Depression Screening  Little interest or pleasure in doing things?     Feeling down, depressed, or hopeless?     PHQ-2 Total Score     Trouble falling or staying asleep, or sleeping too much?     Feeling tired or having little energy?     Poor appetite or overeating?     Feeling bad about yourself - or that you are a failure or have let yourself or your family down?     Trouble concentrating on things, such as reading the newspaper or watching television?     Moving or speaking so slowly that other people could have noticed? Or the opposite - being so  fidgety or restless that you have been moving around a lot more than usual?     Thoughts that you would be better off dead, or of hurting yourself in some way?     PHQ-9 Total Score     If you checked off any problems, how difficult have these problems made it for you to do your work, take care of things at home, or get along with other people?             Result Review  Data Reviewed:                   Assessment and Plan      Diagnoses and all orders for this visit:    1. Attention deficit hyperactivity disorder (ADHD), predominantly inattentive type (Primary)  Comments:  ADHD stable with Vyvanse 60mg. UDS is jermaine spears pending.        Assessment & Plan          Follow Up   Return in about 3 months (around 8/5/2025) for ADHD.  Patient was given instructions and counseling regarding his condition or for health maintenance advice. Please see specific information pulled into the AVS if appropriate.

## 2025-07-21 DIAGNOSIS — F90.0 ATTENTION DEFICIT HYPERACTIVITY DISORDER (ADHD), PREDOMINANTLY INATTENTIVE TYPE: ICD-10-CM

## 2025-07-21 RX ORDER — LISDEXAMFETAMINE DIMESYLATE 60 MG/1
60 CAPSULE ORAL EVERY MORNING
Qty: 30 CAPSULE | Refills: 0 | Status: SHIPPED | OUTPATIENT
Start: 2025-07-21 | End: 2025-08-20

## 2025-07-21 NOTE — TELEPHONE ENCOUNTER
Caller: Abdi Krishna    Relationship: Self    Best call back number:   Telephone Information:   Mobile [phone]       Requested Prescriptions:   Requested Prescriptions     Pending Prescriptions Disp Refills    lisdexamfetamine (Vyvanse) 60 MG capsule 30 capsule 0     Sig: Take 1 capsule by mouth Every Morning for 30 days        Pharmacy where request should be sent: [address] - [phone]  - [phone] [facility]     Last office visit with prescribing clinician: 5/5/2025   Last telemedicine visit with prescribing clinician: Visit date not found   Next office visit with prescribing clinician: 8/6/2025         Does the patient have less than a 3 day supply:  [x] Yes  [] No    Would you like a call back once the refill request has been completed: [] Yes [x] No    If the office needs to give you a call back, can they leave a voicemail: [] Yes [x] No    Trae Duncan Rep   07/21/25 12:37 CDT

## 2025-07-21 NOTE — TELEPHONE ENCOUNTER
Rx Refill Note  Requested Prescriptions     Pending Prescriptions Disp Refills    lisdexamfetamine (Vyvanse) 60 MG capsule 30 capsule 0     Sig: Take 1 capsule by mouth Every Morning for 30 days      Last office visit with prescribing clinician: 5/5/2025   Last telemedicine visit with prescribing clinician: Visit date not found   Next office visit with prescribing clinician: 8/6/2025    Uds current    3rd refill    Nataliya Martinez MA  07/21/25, 12:45 CDT

## 2025-08-06 ENCOUNTER — OFFICE VISIT (OUTPATIENT)
Dept: FAMILY MEDICINE CLINIC | Facility: CLINIC | Age: 35
End: 2025-08-06
Payer: COMMERCIAL

## 2025-08-06 VITALS
SYSTOLIC BLOOD PRESSURE: 116 MMHG | HEIGHT: 70 IN | RESPIRATION RATE: 18 BRPM | HEART RATE: 82 BPM | BODY MASS INDEX: 23.96 KG/M2 | WEIGHT: 167.4 LBS | OXYGEN SATURATION: 99 % | DIASTOLIC BLOOD PRESSURE: 73 MMHG

## 2025-08-06 DIAGNOSIS — F90.0 ATTENTION DEFICIT HYPERACTIVITY DISORDER (ADHD), PREDOMINANTLY INATTENTIVE TYPE: Primary | ICD-10-CM

## 2025-08-06 PROCEDURE — 99213 OFFICE O/P EST LOW 20 MIN: CPT

## 2025-08-06 RX ORDER — LISDEXAMFETAMINE DIMESYLATE 60 MG/1
60 CAPSULE ORAL EVERY MORNING
Qty: 30 CAPSULE | Refills: 0 | Status: SHIPPED | OUTPATIENT
Start: 2025-09-19 | End: 2025-10-19

## 2025-08-06 RX ORDER — LISDEXAMFETAMINE DIMESYLATE 60 MG/1
60 CAPSULE ORAL EVERY MORNING
Qty: 30 CAPSULE | Refills: 0 | Status: SHIPPED | OUTPATIENT
Start: 2025-08-20 | End: 2025-09-19